# Patient Record
Sex: FEMALE | Race: WHITE | HISPANIC OR LATINO | Employment: FULL TIME | URBAN - METROPOLITAN AREA
[De-identification: names, ages, dates, MRNs, and addresses within clinical notes are randomized per-mention and may not be internally consistent; named-entity substitution may affect disease eponyms.]

---

## 2017-08-23 ENCOUNTER — APPOINTMENT (EMERGENCY)
Dept: ULTRASOUND IMAGING | Facility: HOSPITAL | Age: 27
End: 2017-08-23
Payer: COMMERCIAL

## 2017-08-23 ENCOUNTER — HOSPITAL ENCOUNTER (EMERGENCY)
Facility: HOSPITAL | Age: 27
Discharge: HOME | End: 2017-08-24
Attending: EMERGENCY MEDICINE
Payer: COMMERCIAL

## 2017-08-23 DIAGNOSIS — R10.31 RLQ ABDOMINAL PAIN: Primary | ICD-10-CM

## 2017-08-23 LAB
ALBUMIN SERPL BCP-MCNC: 3.6 G/DL (ref 3.5–5)
ALP SERPL-CCNC: 65 U/L (ref 46–116)
ALT SERPL W P-5'-P-CCNC: 29 U/L (ref 12–78)
ANION GAP SERPL CALCULATED.3IONS-SCNC: 8 MMOL/L (ref 4–13)
AST SERPL W P-5'-P-CCNC: 13 U/L (ref 5–45)
BASOPHILS # BLD AUTO: 0.02 THOUSANDS/ΜL (ref 0–0.1)
BASOPHILS NFR BLD AUTO: 0 % (ref 0–1)
BILIRUB SERPL-MCNC: 0.18 MG/DL (ref 0.2–1)
BILIRUB UR QL STRIP: NEGATIVE
BUN SERPL-MCNC: 9 MG/DL (ref 5–25)
CALCIUM SERPL-MCNC: 9.1 MG/DL (ref 8.3–10.1)
CHLORIDE SERPL-SCNC: 105 MMOL/L (ref 100–108)
CLARITY UR: CLEAR
CLARITY, POC: CLEAR
CO2 SERPL-SCNC: 28 MMOL/L (ref 21–32)
COLOR UR: YELLOW
COLOR, POC: YELLOW
CREAT SERPL-MCNC: 0.83 MG/DL (ref 0.6–1.3)
EOSINOPHIL # BLD AUTO: 0.12 THOUSAND/ΜL (ref 0–0.61)
EOSINOPHIL NFR BLD AUTO: 1 % (ref 0–6)
ERYTHROCYTE [DISTWIDTH] IN BLOOD BY AUTOMATED COUNT: 12.7 % (ref 11.6–15.1)
GFR SERPL CREATININE-BSD FRML MDRD: 98 ML/MIN/1.73SQ M
GLUCOSE SERPL-MCNC: 97 MG/DL (ref 65–140)
GLUCOSE UR STRIP-MCNC: NEGATIVE MG/DL
HCG UR QL: NEGATIVE
HCT VFR BLD AUTO: 38.3 % (ref 34.8–46.1)
HGB BLD-MCNC: 13.2 G/DL (ref 11.5–15.4)
HGB UR QL STRIP.AUTO: NEGATIVE
KETONES UR STRIP-MCNC: NEGATIVE MG/DL
LEUKOCYTE ESTERASE UR QL STRIP: NEGATIVE
LYMPHOCYTES # BLD AUTO: 3.63 THOUSANDS/ΜL (ref 0.6–4.47)
LYMPHOCYTES NFR BLD AUTO: 31 % (ref 14–44)
MCH RBC QN AUTO: 29 PG (ref 26.8–34.3)
MCHC RBC AUTO-ENTMCNC: 34.5 G/DL (ref 31.4–37.4)
MCV RBC AUTO: 84 FL (ref 82–98)
MONOCYTES # BLD AUTO: 0.96 THOUSAND/ΜL (ref 0.17–1.22)
MONOCYTES NFR BLD AUTO: 8 % (ref 4–12)
NEUTROPHILS # BLD AUTO: 7.18 THOUSANDS/ΜL (ref 1.85–7.62)
NEUTS SEG NFR BLD AUTO: 60 % (ref 43–75)
NITRITE UR QL STRIP: NEGATIVE
NRBC BLD AUTO-RTO: 0 /100 WBCS
PH UR STRIP.AUTO: 6 [PH] (ref 4.5–8)
PLATELET # BLD AUTO: 290 THOUSANDS/UL (ref 149–390)
PMV BLD AUTO: 9.7 FL (ref 8.9–12.7)
POTASSIUM SERPL-SCNC: 3.7 MMOL/L (ref 3.5–5.3)
PROT SERPL-MCNC: 8.2 G/DL (ref 6.4–8.2)
PROT UR STRIP-MCNC: NEGATIVE MG/DL
RBC # BLD AUTO: 4.55 MILLION/UL (ref 3.81–5.12)
SODIUM SERPL-SCNC: 141 MMOL/L (ref 136–145)
SP GR UR STRIP.AUTO: 1.02 (ref 1–1.03)
UROBILINOGEN UR QL STRIP.AUTO: 0.2 E.U./DL
WBC # BLD AUTO: 11.91 THOUSAND/UL (ref 4.31–10.16)

## 2017-08-23 PROCEDURE — 85025 COMPLETE CBC W/AUTO DIFF WBC: CPT | Performed by: NURSE PRACTITIONER

## 2017-08-23 PROCEDURE — 76830 TRANSVAGINAL US NON-OB: CPT

## 2017-08-23 PROCEDURE — 81003 URINALYSIS AUTO W/O SCOPE: CPT

## 2017-08-23 PROCEDURE — 96374 THER/PROPH/DIAG INJ IV PUSH: CPT

## 2017-08-23 PROCEDURE — 80053 COMPREHEN METABOLIC PANEL: CPT | Performed by: NURSE PRACTITIONER

## 2017-08-23 PROCEDURE — 76856 US EXAM PELVIC COMPLETE: CPT

## 2017-08-23 PROCEDURE — 81025 URINE PREGNANCY TEST: CPT | Performed by: EMERGENCY MEDICINE

## 2017-08-23 PROCEDURE — 36415 COLL VENOUS BLD VENIPUNCTURE: CPT | Performed by: NURSE PRACTITIONER

## 2017-08-23 PROCEDURE — 81002 URINALYSIS NONAUTO W/O SCOPE: CPT | Performed by: EMERGENCY MEDICINE

## 2017-08-23 PROCEDURE — 96361 HYDRATE IV INFUSION ADD-ON: CPT

## 2017-08-23 PROCEDURE — 93976 VASCULAR STUDY: CPT

## 2017-08-23 PROCEDURE — 96375 TX/PRO/DX INJ NEW DRUG ADDON: CPT

## 2017-08-23 RX ORDER — ONDANSETRON 2 MG/ML
4 INJECTION INTRAMUSCULAR; INTRAVENOUS ONCE
Status: COMPLETED | OUTPATIENT
Start: 2017-08-23 | End: 2017-08-23

## 2017-08-23 RX ORDER — KETOROLAC TROMETHAMINE 30 MG/ML
30 INJECTION, SOLUTION INTRAMUSCULAR; INTRAVENOUS ONCE
Status: COMPLETED | OUTPATIENT
Start: 2017-08-23 | End: 2017-08-23

## 2017-08-23 RX ADMIN — SODIUM CHLORIDE 1000 ML: 0.9 INJECTION, SOLUTION INTRAVENOUS at 20:18

## 2017-08-23 RX ADMIN — ONDANSETRON 4 MG: 2 INJECTION INTRAMUSCULAR; INTRAVENOUS at 20:19

## 2017-08-23 RX ADMIN — IOHEXOL 50 ML: 240 INJECTION, SOLUTION INTRATHECAL; INTRAVASCULAR; INTRAVENOUS; ORAL at 23:30

## 2017-08-23 RX ADMIN — KETOROLAC TROMETHAMINE 30 MG: 30 INJECTION, SOLUTION INTRAMUSCULAR at 20:19

## 2017-08-24 ENCOUNTER — APPOINTMENT (EMERGENCY)
Dept: CT IMAGING | Facility: HOSPITAL | Age: 27
End: 2017-08-24
Payer: COMMERCIAL

## 2017-08-24 VITALS
RESPIRATION RATE: 18 BRPM | OXYGEN SATURATION: 100 % | HEART RATE: 78 BPM | TEMPERATURE: 98.1 F | DIASTOLIC BLOOD PRESSURE: 66 MMHG | SYSTOLIC BLOOD PRESSURE: 104 MMHG

## 2017-08-24 PROCEDURE — 99284 EMERGENCY DEPT VISIT MOD MDM: CPT

## 2017-08-24 PROCEDURE — 74176 CT ABD & PELVIS W/O CONTRAST: CPT

## 2017-08-24 RX ORDER — IBUPROFEN 400 MG/1
400 TABLET ORAL EVERY 8 HOURS PRN
Qty: 30 TABLET | Refills: 0 | Status: SHIPPED | OUTPATIENT
Start: 2017-08-24 | End: 2017-09-03 | Stop reason: ALTCHOICE

## 2018-05-02 LAB
ABSOL LYMPHOCYTES (HISTORICAL): 3 K/UL (ref 0.5–4)
ALBUMIN SERPL BCP-MCNC: 4.4 G/DL (ref 3–5.2)
ALP SERPL-CCNC: 51 U/L (ref 43–122)
ALT SERPL W P-5'-P-CCNC: 33 U/L (ref 9–52)
ANION GAP SERPL CALCULATED.3IONS-SCNC: 10 MMOL/L (ref 5–14)
AST SERPL W P-5'-P-CCNC: 18 U/L (ref 14–36)
BASOPHILS # BLD AUTO: 0.1 K/UL (ref 0–0.1)
BASOPHILS # BLD AUTO: 1 % (ref 0–1)
BILIRUB SERPL-MCNC: 0.2 MG/DL
BUN SERPL-MCNC: 11 MG/DL (ref 5–25)
CALCIUM SERPL-MCNC: 9.3 MG/DL (ref 8.4–10.2)
CHLORIDE SERPL-SCNC: 103 MEQ/L (ref 97–108)
CO2 SERPL-SCNC: 26 MMOL/L (ref 22–30)
CREATINE, SERUM (HISTORICAL): 0.69 MG/DL (ref 0.6–1.2)
DEPRECATED RDW RBC AUTO: 14.1 %
EGFR (HISTORICAL): >60 ML/MIN/1.73 M2
EOSINOPHIL # BLD AUTO: 0.1 K/UL (ref 0–0.4)
EOSINOPHIL NFR BLD AUTO: 1 % (ref 0–6)
ERYTHROCYTE SEDIMENTATION RATE (HISTORICAL): 26 MM (ref 1–20)
GLUCOSE SERPL-MCNC: 93 MG/DL (ref 70–99)
HCT VFR BLD AUTO: 37.6 % (ref 36–46)
HGB BLD-MCNC: 12.1 G/DL (ref 12–16)
LYMPHOCYTES NFR BLD AUTO: 34 % (ref 25–45)
MCH RBC QN AUTO: 26.8 PG (ref 26–34)
MCHC RBC AUTO-ENTMCNC: 32.3 % (ref 31–36)
MCV RBC AUTO: 83 FL (ref 80–100)
MONOCYTES # BLD AUTO: 0.8 K/UL (ref 0.2–0.9)
MONOCYTES NFR BLD AUTO: 9 % (ref 1–10)
NEUTROPHILS ABS COUNT (HISTORICAL): 4.8 K/UL (ref 1.8–7.8)
NEUTS SEG NFR BLD AUTO: 55 % (ref 45–65)
PLATELET # BLD AUTO: 325 K/MCL (ref 150–450)
POTASSIUM SERPL-SCNC: 4.1 MEQ/L (ref 3.6–5)
RBC # BLD AUTO: 4.53 M/MCL (ref 4–5.2)
RHEUMATOID FACTOR (HISTORICAL): <8.6 IU/ML
SODIUM SERPL-SCNC: 139 MEQ/L (ref 137–147)
TOTAL PROTEIN (HISTORICAL): 7.5 G/DL (ref 5.9–8.4)
TSH SERPL DL<=0.05 MIU/L-ACNC: 1.52 UIU/ML (ref 0.47–4.68)
WBC # BLD AUTO: 8.7 K/MCL (ref 4.5–11)

## 2018-05-03 LAB — RPR SCREEN (HISTORICAL): NORMAL

## 2018-05-04 LAB — ANTI-NUCLEAR ANTIBODY (ANA) (HISTORICAL): NORMAL

## 2018-08-17 ENCOUNTER — OFFICE VISIT (OUTPATIENT)
Dept: FAMILY MEDICINE CLINIC | Facility: CLINIC | Age: 28
End: 2018-08-17
Payer: COMMERCIAL

## 2018-08-17 VITALS
SYSTOLIC BLOOD PRESSURE: 110 MMHG | HEART RATE: 101 BPM | HEIGHT: 62 IN | TEMPERATURE: 97 F | DIASTOLIC BLOOD PRESSURE: 90 MMHG | WEIGHT: 150 LBS | OXYGEN SATURATION: 99 % | RESPIRATION RATE: 16 BRPM | BODY MASS INDEX: 27.6 KG/M2

## 2018-08-17 DIAGNOSIS — J30.0 VASOMOTOR RHINITIS: ICD-10-CM

## 2018-08-17 DIAGNOSIS — N91.2 AMENORRHEA: Primary | ICD-10-CM

## 2018-08-17 DIAGNOSIS — M54.42 ACUTE BILATERAL LOW BACK PAIN WITH LEFT-SIDED SCIATICA: ICD-10-CM

## 2018-08-17 PROBLEM — M54.40 ACUTE BILATERAL LOW BACK PAIN WITH SCIATICA: Status: ACTIVE | Noted: 2018-08-17

## 2018-08-17 LAB — SL AMB POCT URINE HCG: NEGATIVE

## 2018-08-17 PROCEDURE — 99213 OFFICE O/P EST LOW 20 MIN: CPT | Performed by: FAMILY MEDICINE

## 2018-08-17 PROCEDURE — 3725F SCREEN DEPRESSION PERFORMED: CPT | Performed by: FAMILY MEDICINE

## 2018-08-17 PROCEDURE — 3008F BODY MASS INDEX DOCD: CPT | Performed by: FAMILY MEDICINE

## 2018-08-17 PROCEDURE — 81025 URINE PREGNANCY TEST: CPT | Performed by: FAMILY MEDICINE

## 2018-08-17 RX ORDER — GABAPENTIN 100 MG/1
CAPSULE ORAL
COMMUNITY
Start: 2018-05-16 | End: 2018-10-25

## 2018-08-17 RX ORDER — METHOCARBAMOL 500 MG/1
500 TABLET, FILM COATED ORAL 4 TIMES DAILY
Qty: 30 TABLET | Refills: 2 | Status: SHIPPED | OUTPATIENT
Start: 2018-08-17 | End: 2018-10-25

## 2018-08-17 RX ORDER — IBUPROFEN 800 MG/1
TABLET ORAL
COMMUNITY
Start: 2018-07-27 | End: 2018-10-25

## 2018-08-17 RX ORDER — CETIRIZINE HYDROCHLORIDE 10 MG/1
10 TABLET, CHEWABLE ORAL DAILY
Qty: 30 TABLET | Refills: 2 | Status: SHIPPED | OUTPATIENT
Start: 2018-08-17 | End: 2018-10-25

## 2018-08-17 RX ORDER — NAPROXEN 500 MG/1
TABLET ORAL EVERY 12 HOURS
COMMUNITY
Start: 2018-05-02 | End: 2018-10-25

## 2018-08-17 RX ORDER — SENNOSIDES 8.6 MG
CAPSULE ORAL EVERY 8 HOURS
COMMUNITY
Start: 2018-05-02 | End: 2018-10-25

## 2018-08-17 NOTE — LETTER
August 17, 2018     Patient: Luis Manuel Noel   YOB: 1990   Date of Visit: 8/17/2018       To Whom it May Concern:    Mellisa Shelley is under my professional care  She was seen in my office on 8/17/2018  She needs to be excused from work due to her condition  She may return to work on 8/23/2018  If you have any questions or concerns, please don't hesitate to call           Sincerely,          Joe Vang MD

## 2018-08-17 NOTE — PROGRESS NOTES
Assessment/Plan:    Vasomotor rhinitis  Cetiirizine reordered    Amenorrhea  The last menstrual period was May 10 patient denies any nausea or vomiting  Urine pregnancy test done in the office negative    Acute bilateral low back pain with sciatica  Patient was sent for physical therapy on the previous visit, but she was not able to finish due to increased pain, numbing sensation in her left leg with tingling, the MRI was recommended  Pain management with naproxen and ibuprofen with minimal relief, will add Robaxin  Excuse from work letter given for 5 days, encouraged to continue exercises to decrease muscle stiffness  Will follow up with MRI result       Diagnoses and all orders for this visit:    Amenorrhea  -     POCT urine HCG    Acute bilateral low back pain with left-sided sciatica  -     MRI lumbar spine wo contrast; Future  -     methocarbamol (ROBAXIN) 500 mg tablet; Take 1 tablet (500 mg total) by mouth 4 (four) times a day    Vasomotor rhinitis  -     cetirizine (ZyrTEC) 10 MG chewable tablet; Chew 1 tablet (10 mg total) daily    Other orders  -     ibuprofen (MOTRIN) 800 mg tablet;   -     gabapentin (NEURONTIN) 100 mg capsule;   -     acetaminophen (TYLENOL) 650 mg CR tablet; every 8 (eight) hours  -     naproxen (NAPROSYN) 500 mg tablet; Every 12 hours          Subjective:      Patient ID: Braden Jones is a 33 y o  female  This is a 81-year-old female who presents to follow-up with her chronic back pain that started about 3 months ago was suddenly while she was at home as per patient  She stated that her pain is constant, radiating into left thigh and left foot, shooting sensation to the left thigh  No difficulty with urination or moving her bowel  She was seen at physical therapy but she was not able to finish a complete course due to increased pain, and the imaging was recommended by PT          The following portions of the patient's history were reviewed and updated as appropriate: allergies, current medications, past family history, past medical history, past social history, past surgical history and problem list     Review of Systems   Constitutional: Negative for chills, diaphoresis, fatigue and fever  HENT: Negative for congestion, ear discharge, ear pain, mouth sores, rhinorrhea, sore throat and trouble swallowing  Eyes: Negative for photophobia, pain and discharge  Respiratory: Negative for cough, chest tightness, shortness of breath and wheezing  Cardiovascular: Negative for chest pain, palpitations and leg swelling  Gastrointestinal: Negative for abdominal distention, abdominal pain, blood in stool, constipation, diarrhea and nausea  Genitourinary: Negative for difficulty urinating and frequency  Musculoskeletal: Positive for back pain  Negative for joint swelling and neck stiffness  Skin: Negative for color change, pallor and rash  Neurological: Negative for dizziness, syncope, numbness and headaches  Objective:      /90 (BP Location: Left arm, Patient Position: Sitting, Cuff Size: Adult)   Pulse 101   Temp (!) 97 °F (36 1 °C) (Tympanic)   Resp 16   Ht 5' 2" (1 575 m)   Wt 68 kg (150 lb)   LMP 05/16/2018   SpO2 99%   Breastfeeding? No Comment: pt is unsure if she is pregnant, has not done home pregnancy test  BMI 27 44 kg/m²          Physical Exam   Constitutional: She is oriented to person, place, and time  She appears well-developed and well-nourished  No distress  HENT:   Head: Normocephalic and atraumatic  Eyes: EOM are normal  Pupils are equal, round, and reactive to light  No scleral icterus  Neck: Normal range of motion  Neck supple  Cardiovascular: Normal rate, regular rhythm and normal heart sounds  Exam reveals no gallop and no friction rub  No murmur heard  Pulmonary/Chest: Effort normal and breath sounds normal  No respiratory distress  She has no wheezes  She has no rales  She exhibits no tenderness     Abdominal: Soft  Bowel sounds are normal  She exhibits no distension  There is no tenderness  There is no rebound  Musculoskeletal: Normal range of motion  She exhibits no edema or deformity  Lumbar back: She exhibits tenderness and spasm  Positive straight leg raise test on the left   Lymphadenopathy:     She has no cervical adenopathy  Neurological: She is alert and oriented to person, place, and time  Skin: Skin is warm and dry  No rash noted  No erythema  No pallor  Psychiatric: She has a normal mood and affect

## 2018-08-17 NOTE — ASSESSMENT & PLAN NOTE
Patient was sent for physical therapy on the previous visit, but she was not able to finish due to increased pain, numbing sensation in her left leg with tingling, the MRI was recommended  Pain management with naproxen and ibuprofen with minimal relief, will add Robaxin  Excuse from work letter given for 5 days, encouraged to continue exercises to decrease muscle stiffness  Will follow up with MRI result

## 2018-08-17 NOTE — ASSESSMENT & PLAN NOTE
The last menstrual period was May 10 patient denies any nausea or vomiting  Urine pregnancy test done in the office negative

## 2018-08-17 NOTE — PATIENT INSTRUCTIONS
Dolor pushpa de espalda inferior   LO QUE NECESITA SABER:   El dolor pushpa de la región lumbar de la espalda es juan molestia repentina en la parte inferior de weber espalda que dura hasta por 6 semanas  La molestia hace que sea dificil que usted tolere la [place]  INSTRUCCIONES SOBRE EL [facility]:   Regrese a la nannette de emergencias si:   · Usted tiene dolor intenso  · Usted repentinamente tiene rigidez o siente pesadez en ambos glúteos hacia abajo de ambas piernas  · Usted tiene entumecimiento o debilidad en juan pierna o dolor en ambas piernas  · Usted tiene entumecimiento en el área genital o en la región lumbar  · Usted no puede controlar weber orina ni courtney deposiciones intestinales  Pregúntele a weber [place]ode vitaminas y minerales son adecuados para usted  · Usted tiene fiebre  · Usted tiene un dolor por la noche o cuando descansa  · Weber dolor no mejora con el tratamiento  · Usted tiene dolor que empeora cuando tose o estornuda  · Usted siente un estallido o chasquido repentino en weber espalda  · Usted tiene preguntas o inquietudes acerca de weber condición o cuidado  Medicamentos:  Los siguientes medicamentos pueden  ser recetados por weber médico:  · El acetaminofén  angie el dolor  Está disponible sin receta médica  Pregunte la cantidad y la frecuencia con que debe tomarlos  [address]  El acetaminofén puede causar daño en el hígado cuando no se gladis de forma correcta  · AINEs (Analgésicos antiinflamatorios no esteroides)  ayudan a disminuir la inflamación y el dolor  [place] medicamento esta disponible con o sin juan receta médica  Los AINEs pueden causar sangrado estomacal o problemas renales en ciertas personas  Si usted gladis un medicamento anticoagulante, siempre pregúntele a weber médico si los CHUNG son seguros para usted  Siempre lisa la etiqueta de kh[place]bu medicamento y [place] instrucciones  · Un medicamento con receta para el dolor  podrían ser [place]  Pregunte al médico cómo debe maria e khushbu medicamento de forma hurd  · Relajantes musculares  disminuyen el dolor y [employer] músculos de la parte inferior de la [place]  · [place] courtney medicamentos kunal se le haya indicado  Consulte con weber médico si usted baldemar que weber medicamento no le está ayudando o si presenta efectos secundarios  Infórmele si es alérgico a algún medicamento  Mantenga juan lista actualizada de los [employer], las vitaminas y los productos herbales que gladis  Incluya los siguientes datos de los medicamentos: cantidad, frecuencia y motivo de administración  Traiga con usted la lista o los envases de la píldoras a courtney citas de seguimiento  Lleve la lista de los medicamentos con usted en amirah de juan emergencia  Cuidados personales:   · Manténgase activo  lo más que pueda sin causar más dolor  El reposo en cama puede empeorar weber dolor de espalda  Comience con ejercicios ligeros kunal caminar  Evite levantar objetos hasta que ya no tenga dolor  Solicite más información acerca de las actividades físicas o plan de ejercicios que son los adecuados para usted  · El hielo  ayuda a disminuir la inflamación, el dolor y los espasmos musculares  Ponga hielo jostin en juan bolsa plástica  Cúbrala con juan toalla  Aplíquela en weber tamie lumbar por 20 a 30 minutos cada 2 horas  Jannie esto por 2 a 3 días después que el dolor empiece, o según lo indicado  · El calor  ayuda a disminuir dolor y espasmos musculares  Empiece a utilizar calor después de torie terminado el tratamiento con el hielo  Utilice juan toalla pequeña empapada con [place], juan almohada térmica o tome un baño de kiko con agua tibia  Aplíquese calor en el área lesionada josé luis 20 a 30 minutos cada 2 horas josé luis la cantidad de [employer] indiquen  [place] entre el calor y el hielo  Prevenir el dolor pushpa de la parte inferior de la espalda:   · Use la mecánica corporal adecuada        ¨ Flexione la cadera y las rodillas cuando Kellen Frannie a levantar un objeto  No doble la cintura  Utilice los [employer] de las piernas mientras levanta mace carga  No use mace espalda  Mantenga el objeto cerca de mace pecho mientras lo levanta  No se tuerza, ni levante cualquier cosa por encima de mace cintura  ¨ Cambie mace posición frecuentemente cuando pase mucho tiempo de pie  Descanse un pie sobre juan [place] o un reposapiés e intercambie con el otro pie frecuentemente  ¨ No permanezca sentado por lapsos de tiempo prolongados  Cuando sea necesario hacerlo, siéntese en juan silla de respaldo recto con los pies apoyados en el suelo  Nunca alcance, jale ni empuje mientras se encuentra sentando  · Jannie ejercicios que fortalezcan courtney músculos de la espalda  Entre en calor antes de hacer ejercicio  Consulte con mace médico sobre [employer] plan de ejercicios para usted  · Mantenga un peso saludable  Consulte con mace médico cuánto debería pesar  Pida que le ayude a crear un plan para bajar de peso si usted tiene sobrepeso  Acuda a courtney consultas de control con mace médico según le indicaron  Regrese a juan dahlia de seguimiento si usted aun tiene [employer] de 1 a 3 semanas de [place]  Puede que usted necesite acudir con un ortopedista si mace dolor de espalda dura más de 12 semanas  Anote courtney preguntas para que se acuerde de hacerlas josé luis courtney visitas  © 2017 [address] Information is for End User's use only and may not be sold, redistributed or otherwise used for commercial purposes  All illustrations and images included in CareNotes® are the copyrighted property of A D A M , Inc  or Damian Rao  Esta información es sólo para uso en educación  Mace intención no es darle un consejo médico sobre enfermedades o tratamientos  Colsulte con mace Kaela Blair farmacéutico antes de seguir cualquier régimen médico para saber si es seguro y efectivo para usted

## 2018-08-30 ENCOUNTER — HOSPITAL ENCOUNTER (OUTPATIENT)
Dept: MRI IMAGING | Facility: HOSPITAL | Age: 28
Discharge: HOME | End: 2018-08-30
Payer: COMMERCIAL

## 2018-08-30 DIAGNOSIS — M54.42 ACUTE BILATERAL LOW BACK PAIN WITH LEFT-SIDED SCIATICA: ICD-10-CM

## 2018-08-30 PROCEDURE — 72148 MRI LUMBAR SPINE W/O DYE: CPT

## 2018-09-11 ENCOUNTER — TELEPHONE (OUTPATIENT)
Dept: FAMILY MEDICINE CLINIC | Facility: CLINIC | Age: 28
End: 2018-09-11

## 2018-09-11 NOTE — TELEPHONE ENCOUNTER
Patient called into the front office requesting her results  Please contact pt with MRI results pt had done on 8/30/18 Macedonian speaking only

## 2018-09-12 ENCOUNTER — TELEPHONE (OUTPATIENT)
Dept: FAMILY MEDICINE CLINIC | Facility: CLINIC | Age: 28
End: 2018-09-12

## 2018-09-12 NOTE — TELEPHONE ENCOUNTER
Advised pt MRI results were normal and should keep f/u appt or reschedule sooner if need be  Pt stated she needed to be seen sooner because she can hardly move due to her pain  Pt stated the pain radiates to her legs  Pt also stated she has been experiencing a lot of pressure in her head  Transferred call to [facility] [place] to reschedule appt  Pt was aware if you were not available sooner, she would have to see a different provider

## 2018-10-25 ENCOUNTER — APPOINTMENT (EMERGENCY)
Dept: CT IMAGING | Facility: HOSPITAL | Age: 28
End: 2018-10-25
Payer: COMMERCIAL

## 2018-10-25 ENCOUNTER — HOSPITAL ENCOUNTER (EMERGENCY)
Facility: HOSPITAL | Age: 28
Discharge: HOME | End: 2018-10-25
Attending: EMERGENCY MEDICINE
Payer: COMMERCIAL

## 2018-10-25 VITALS
DIASTOLIC BLOOD PRESSURE: 66 MMHG | SYSTOLIC BLOOD PRESSURE: 102 MMHG | BODY MASS INDEX: 28.35 KG/M2 | WEIGHT: 155 LBS | HEART RATE: 77 BPM | OXYGEN SATURATION: 100 % | TEMPERATURE: 98.4 F | RESPIRATION RATE: 16 BRPM

## 2018-10-25 DIAGNOSIS — M62.830 BACK MUSCLE SPASM: ICD-10-CM

## 2018-10-25 DIAGNOSIS — M54.9 ACUTE BACK PAIN, UNSPECIFIED BACK LOCATION, UNSPECIFIED BACK PAIN LATERALITY: ICD-10-CM

## 2018-10-25 DIAGNOSIS — R10.9 ABDOMINAL PAIN, UNSPECIFIED ABDOMINAL LOCATION: Primary | ICD-10-CM

## 2018-10-25 LAB
ALBUMIN SERPL BCP-MCNC: 3.5 G/DL (ref 3.5–5)
ALP SERPL-CCNC: 66 U/L (ref 46–116)
ALT SERPL W P-5'-P-CCNC: 23 U/L (ref 12–78)
ANION GAP SERPL CALCULATED.3IONS-SCNC: 6 MMOL/L (ref 4–13)
AST SERPL W P-5'-P-CCNC: 13 U/L (ref 5–45)
BASOPHILS # BLD AUTO: 0.03 THOUSANDS/ΜL (ref 0–0.1)
BASOPHILS NFR BLD AUTO: 0 % (ref 0–1)
BILIRUB SERPL-MCNC: 0.22 MG/DL (ref 0.2–1)
BILIRUB UR QL STRIP: NEGATIVE
BUN SERPL-MCNC: 9 MG/DL (ref 5–25)
CALCIUM SERPL-MCNC: 9.1 MG/DL (ref 8.3–10.1)
CHLORIDE SERPL-SCNC: 105 MMOL/L (ref 100–108)
CLARITY UR: NORMAL
CO2 SERPL-SCNC: 28 MMOL/L (ref 21–32)
COLOR UR: YELLOW
CREAT SERPL-MCNC: 0.79 MG/DL (ref 0.6–1.3)
EOSINOPHIL # BLD AUTO: 0.17 THOUSAND/ΜL (ref 0–0.61)
EOSINOPHIL NFR BLD AUTO: 2 % (ref 0–6)
ERYTHROCYTE [DISTWIDTH] IN BLOOD BY AUTOMATED COUNT: 11.9 % (ref 11.6–15.1)
EXT PREG TEST URINE: NORMAL
GFR SERPL CREATININE-BSD FRML MDRD: 103 ML/MIN/1.73SQ M
GLUCOSE SERPL-MCNC: 104 MG/DL (ref 65–140)
GLUCOSE UR STRIP-MCNC: NEGATIVE MG/DL
HCT VFR BLD AUTO: 40.7 % (ref 34.8–46.1)
HGB BLD-MCNC: 13.7 G/DL (ref 11.5–15.4)
HGB UR QL STRIP.AUTO: NEGATIVE
IMM GRANULOCYTES # BLD AUTO: 0.02 THOUSAND/UL (ref 0–0.2)
IMM GRANULOCYTES NFR BLD AUTO: 0 % (ref 0–2)
KETONES UR STRIP-MCNC: NEGATIVE MG/DL
LEUKOCYTE ESTERASE UR QL STRIP: NEGATIVE
LIPASE SERPL-CCNC: 214 U/L (ref 73–393)
LYMPHOCYTES # BLD AUTO: 3.5 THOUSANDS/ΜL (ref 0.6–4.47)
LYMPHOCYTES NFR BLD AUTO: 46 % (ref 14–44)
MCH RBC QN AUTO: 28.8 PG (ref 26.8–34.3)
MCHC RBC AUTO-ENTMCNC: 33.7 G/DL (ref 31.4–37.4)
MCV RBC AUTO: 86 FL (ref 82–98)
MONOCYTES # BLD AUTO: 0.63 THOUSAND/ΜL (ref 0.17–1.22)
MONOCYTES NFR BLD AUTO: 8 % (ref 4–12)
NEUTROPHILS # BLD AUTO: 3.36 THOUSANDS/ΜL (ref 1.85–7.62)
NEUTS SEG NFR BLD AUTO: 44 % (ref 43–75)
NITRITE UR QL STRIP: NEGATIVE
NRBC BLD AUTO-RTO: 0 /100 WBCS
PH UR STRIP.AUTO: 5.5 [PH] (ref 4.5–8)
PLATELET # BLD AUTO: 295 THOUSANDS/UL (ref 149–390)
PMV BLD AUTO: 9.2 FL (ref 8.9–12.7)
POTASSIUM SERPL-SCNC: 3.8 MMOL/L (ref 3.5–5.3)
PROT SERPL-MCNC: 7.9 G/DL (ref 6.4–8.2)
PROT UR STRIP-MCNC: NEGATIVE MG/DL
RBC # BLD AUTO: 4.76 MILLION/UL (ref 3.81–5.12)
SODIUM SERPL-SCNC: 139 MMOL/L (ref 136–145)
SP GR UR STRIP.AUTO: >=1.03 (ref 1–1.03)
UROBILINOGEN UR QL STRIP.AUTO: 0.2 E.U./DL
WBC # BLD AUTO: 7.71 THOUSAND/UL (ref 4.31–10.16)

## 2018-10-25 PROCEDURE — 96374 THER/PROPH/DIAG INJ IV PUSH: CPT

## 2018-10-25 PROCEDURE — 80053 COMPREHEN METABOLIC PANEL: CPT | Performed by: NURSE PRACTITIONER

## 2018-10-25 PROCEDURE — 36415 COLL VENOUS BLD VENIPUNCTURE: CPT | Performed by: NURSE PRACTITIONER

## 2018-10-25 PROCEDURE — 83690 ASSAY OF LIPASE: CPT | Performed by: NURSE PRACTITIONER

## 2018-10-25 PROCEDURE — 85025 COMPLETE CBC W/AUTO DIFF WBC: CPT | Performed by: NURSE PRACTITIONER

## 2018-10-25 PROCEDURE — 81025 URINE PREGNANCY TEST: CPT | Performed by: NURSE PRACTITIONER

## 2018-10-25 PROCEDURE — 74176 CT ABD & PELVIS W/O CONTRAST: CPT

## 2018-10-25 PROCEDURE — 81003 URINALYSIS AUTO W/O SCOPE: CPT

## 2018-10-25 PROCEDURE — 99284 EMERGENCY DEPT VISIT MOD MDM: CPT

## 2018-10-25 RX ORDER — METHOCARBAMOL 500 MG/1
1000 TABLET, FILM COATED ORAL ONCE
Status: COMPLETED | OUTPATIENT
Start: 2018-10-25 | End: 2018-10-25

## 2018-10-25 RX ORDER — METHOCARBAMOL 750 MG/1
750 TABLET, FILM COATED ORAL 3 TIMES DAILY
Qty: 21 TABLET | Refills: 0 | Status: SHIPPED | OUTPATIENT
Start: 2018-10-25 | End: 2019-04-01

## 2018-10-25 RX ORDER — KETOROLAC TROMETHAMINE 30 MG/ML
15 INJECTION, SOLUTION INTRAMUSCULAR; INTRAVENOUS ONCE
Status: COMPLETED | OUTPATIENT
Start: 2018-10-25 | End: 2018-10-25

## 2018-10-25 RX ORDER — SENNOSIDES 8.6 MG
650 CAPSULE ORAL EVERY 8 HOURS PRN
Qty: 21 TABLET | Refills: 0 | Status: SHIPPED | OUTPATIENT
Start: 2018-10-25 | End: 2018-11-01

## 2018-10-25 RX ORDER — ACETAMINOPHEN 325 MG/1
975 TABLET ORAL ONCE
Status: COMPLETED | OUTPATIENT
Start: 2018-10-25 | End: 2018-10-25

## 2018-10-25 RX ADMIN — ACETAMINOPHEN 975 MG: 325 TABLET, FILM COATED ORAL at 11:05

## 2018-10-25 RX ADMIN — KETOROLAC TROMETHAMINE 15 MG: 30 INJECTION, SOLUTION INTRAMUSCULAR at 11:12

## 2018-10-25 RX ADMIN — METHOCARBAMOL 1000 MG: 500 TABLET, FILM COATED ORAL at 11:05

## 2018-10-25 NOTE — DISCHARGE INSTRUCTIONS
Dolor abdominal pushpa   LO QUE NECESITA SABER:   No se puede encontrar la causa del dolor abdominal  Si se encuentra juan causa, el tratamiento dependerá de cuál es cayden causa  INSTRUCCIONES SOBRE [facility]:   Regrese a la nannette de emergencias si:   · Usted no puede dejar de vomitar o vomita maria esther  · Usted tiene [employer] evacuaciones intestinales o estas tienen un aspecto alquitranado  · Usted tiene sangrado por weber recto  · El tamaño del abdomen es más violeta de lo normal y se siente [place] y [place] doloroso  · Usted tiene dolor abdominal intenso  · Usted anita de tener flatulencias y evacuaciones intestinales  · Usted se siente mareado, débil o tiene sensación de [place]  Pregúntele a weber [place] vitaminas y minerales son adecuados para usted  · Usted tiene fiebre  · Tiene nuevos signos y síntomas  · Deborah síntomas no mejoran con el tratamiento  · Usted tiene preguntas o inquietudes acerca de weber condición o cuidado  Medicamentos,  estos pueden administrarse para disminuir el dolor, tratar juan infección y [place] deborah síntomas  [place] los [employer] kunal se le haya indicado  Llame a weber médico si usted piensa que el medicamento no está ayudando o si tiene efectos secundarios  Infórmele si es alérgico a cualquier medicamento  Mantenga juan lista actualizada de los [employer], las vitaminas y los productos herbales que gladis  Incluya los siguientes datos de los medicamentos: cantidad, frecuencia y motivo de administración  Traiga con usted la lista o los envases de la píldoras a deborah citas de seguimiento  Lleve la lista de los medicamentos con usted en amirah de juan emergencia  El [place] de weber síntomas:   · La aplicación de calor  sobre el abdomen de 20 a 30 minutos cada 2 horas por los [employer] indiquen  El calor ayuda a disminuir el dolor y los espasmos musculares  · Controle weber estrés    El estrés puede causar dolor abdominal  Weber médico puede recomendarle técnicas de relajación y ejercicios de respiración profunda para ayudar a disminuir el estrés  Weber médico puede recomendarle que hable con alguien sobre weber estrés o ansiedad, kunal un consejero o un amigo de [place]  [place] lo suficiente y realice ejercicio regularmente  · Limite o no consuma bebidas alcohólicas  El alcohol puede empeorar el dolor abdominal  Pregunte a weber médico si usted puede maria e alcohol  Pregunte cuanto es la cantidad hurd para usted maria e  · No fume  La nicotina y otros químicos en los cigarrillos pueden dañarle el esófago y [place]  Pida información a weber médico si usted actualmente fuma y necesita ayuda para dejar de fumar  Los cigarrillos electrónicos o tabaco sin humo todavía contienen nicotina  Consulte con weber médico antes de [employer]  Realice cambios en los alimentos que consume según se le indique:  No coma alimentos que causan dolor abdominal u otros síntomas  Ingiera comidas pequeñas, más a menudo  · Coma más alimentos ricos en fibra si tiene estreñimiento  Los alimentos altos en fibra incluyen frutas, verduras, alimentos de grano integral y legumbres  · No coma alimentos que causan gas si tiene distensión  Por ejemplo, brócoli, col y coliflor  No shirley gaseosas o bebidas carbonadas, ya que también pueden provocarle gases  · No consuma alimentos o bebidas que contienen sorbitol o fructosa si tiene diarrea y distensión  Sadie Aurelio son jugos de frutas, dulces, mermeladas y gomas de mascar sin azúcar  · No coma alimentos altos en grasas, kunal comidas fritas, hamburguesas con queso, salchichas y postres  · Limite o no tome cafeína  La cafeína [employer], kunal la acidez o las náuseas  · [place] suficientes líquidos para evitar la deshidratación causada por la diarrea o los vómitos  Pregunte a weber médico sobre la cantidad de líquido que necesita maria e todos los días y cuáles le recomienda    Acuda a courtney consultas de control con weber médico según le indicaron  Anote courtney preguntas para que se acuerde de hacerlas josé luis courtney visitas  © 2017 [address] [employer] Information is for End User's use only and may not be sold, redistributed or otherwise used for commercial purposes  All illustrations and images included in CareNotes® are the copyrighted property of A D A M , Inc  or Damian Rao  Esta información es sólo para uso en educación  Mace intención no es darle un consejo médico sobre enfermedades o tratamientos  Colsulte con mace Mandy Antis farmacéutico antes de seguir cualquier régimen médico para saber si es seguro y efectivo para usted  Dolor de espalda   LO QUE NECESITA SABER:   El dolor de espalda es común  Puede ser causado por juan gran cantidad de afecciones, kunal la artritis o por el deterioro de los discos de la [place]umna vertebral  El riesgo del dolor de espalda aumenta al sufrir lesiones, por falta de [facility], o inclinarse hacia adelante o girar de un lado a otro de forma repetitiva  Usted puede estar adolorido o sentir rigidez en derek o ambos lados de la espalda  El dolor se puede propagar a courtney glúteos o muslos  INSTRUCCIONES SOBRE [facility]:   Medicamentos:   · AINEs (Analgésicos antiinflamatorios no esteroides)  ayudan a disminuir la inflamación y el dolor  [place] medicamento esta disponible con o sin juan receta médica  Los AINEs pueden causar sangrado estomacal o problemas renales en ciertas personas  Si usted gladis un medicamento anticoagulante, siempre pregúntele a mace médico si los CHUNG son seguros para usted  Siempre lisa la etiqueta de khushbu medicamento y [place] instrucciones  · El acetaminofén  [employer]  Está disponible sin receta médica  Pregunte la cantidad y la frecuencia con que debe tomarlos  [address]  El acetaminofén puede causar daño en el hígado cuando no se gladis de forma correcta      · Un medicamento con receta para el dolor  podrían ser administrados  Pregunte al médico cómo debe maria e khushbu medicamento de forma hurd  · [place] courtney medicamentos kunal se le haya indicado  Consulte con weber médico si usted baldemar que weber medicamento no le está ayudando o si presenta efectos secundarios  Infórmele si es alérgico a cualquier medicamento  Mantenga juan lista actualizada de los [place], las vitaminas y los productos herbales que gladis  Incluya los siguientes datos de los medicamentos: cantidad, frecuencia y motivo de administración  Traiga con usted la lista o los envases de la píldoras a courtney citas de seguimiento  Lleve la lista de los medicamentos con usted en amirah de juan emergencia  Acuda en 2 semanas a weber dahlia de control con weber médico, o según le indicaron:  Anote courtney preguntas para que se acuerde de hacerlas josé luis courtney visitas  La forma de controlar weber dolor de espalda:   · Aplique hielo  en weber espalda o en el área afectada de 15 a 20 minutos cada hora o según le indicaron  Use un paquete de hielo o ponga hielo molido dentro de [employer]  Cúbrala con juan toalla  El hielo disminuye el dolor y ayuda a evitar el daño en los tejidos  · La aplicación de calor  en weber espalda o en el área afectada de 20 a 30 minutos cada 2 horas josé luis los días que le indicaron  El calor ayuda a disminuir el dolor y los espasmos musculares  · Manténgase activo  lo más que pueda sin causar [employer]  El reposo en cama puede empeorar weber dolor de espalda  Evite levantar objetos hasta que ya no tenga dolor  Regrese a la nannette de emergencias si:   · Usted tiene dolor, entumecimiento o debilidad en juan o en ambas piernas  · El dolor se vuelve tan intenso que lo incapacita para caminar  · Usted no puede controlar weber orina ni courtney deposiciones intestinales  · Usted tiene dolor de espalda intenso con dolor en el pecho  · Usted tiene dolor de espalda intenso, náuseas y vómito      · Usted tiene un dolor de espalda intenso que se propaga a un costado o al área genital   Pregúntele a mace Gloria Mercer vitaminas y minerales son adecuados para usted  · Usted tiene dolor de espalda que no mejora con el reposo, ni con el medicamento para el dolor  · Usted tiene fiebre  · Usted tiene un dolor que empeora cuando está acostado boca Lilian Ambrosio o al descansar  · Usted tiene dolor que empeora cuando tose o estornuda  · Usted pierde peso sin proponérselo  · Usted tiene preguntas o inquietudes acerca de mace condición o cuidado  © 2017 [address] [employer] Information is for End User's use only and may not be sold, redistributed or otherwise used for commercial purposes  All illustrations and images included in CareNotes® are the copyrighted property of A D A M , Inc  or Damian Rao  Esta información es sólo para uso en educación  Mace intención no es darle un consejo médico sobre enfermedades o tratamientos  Colsulte con mace Classie Doe farmacéutico antes de seguir cualquier régimen médico para saber si es seguro y efectivo para usted  Espasmo muscular   LO QUE NECESITA SABER:   Un espasmo muscular es juan contracción convulsiva involuntaria de cualquier músculo o de un keron de músculos  Un calambre muscular es un espasmo muscular muy doloroso  Los calambres musculares generalmente ocurren después del ejercicio intenso o josé luis el [facility]  Estos también pueden ser provocados por ciertos medicamentos, la deshidratación, bajos niveles de calcio o magnesio u otras condiciones de [place] Vanetta Mcburney [facility]:   Medicamentos:  Usted podría  necesitar lo siguiente:  · AINEs (Analgésicos antiinflamatorios no esteroides)  pueden disminuir la inflamación y el dolor o la fiebre  [place] medicamento esta disponible con o sin juan receta médica  Los AINEs pueden causar sangrado estomacal o problemas renales en ciertas personas   Si usted gladis un medicamento anticoagulante, siempre pregúntele a mace médico si los CHUNG son seguros para usted  Siempre lisa la etiqueta de kh[place]bu medicamento y [place] instrucciones  · [place] courtney medicamentos kunal se le haya indicado  Consulte con weber médico si usted baldemar que weber medicamento no le está ayudando o si presenta efectos secundarios  Infórmele si es alérgico a algún medicamento  Mantenga juan lista actualizada de los [place], las vitaminas y los productos herbales que gladis  Incluya los siguientes datos de los medicamentos: cantidad, frecuencia y motivo de administración  Traiga con usted la lista o los envases de la píldoras a courtney citas de seguimiento  Lleve la lista de los medicamentos con usted en amirah de juan emergencia  Acuda a courtney consultas de control con weber médico según le indicaron  Usted puede necesitar otros exámenes o tratamientos  También es posible que lo refieran a un fisioterapeuta u otro especialista  Anote courtney preguntas para que se acuerde de hacerlas josé luis courtney visitas  Cuidados personales:   · El estiramiento  de weber músculo ayuda a aliviar el calambre  También puede ser de [place] el músculo estirado hasta que el calambre desaparezca  · Aplique calor  para ayudar a disminuir el dolor y el espasmo muscular  Aplíquese calor en el área lesionada josé luis 20 a 30 minutos cada 2 horas josé luis la cantidad de [employer] indiquen  · Aplique hielo  para ayudar a disminuir la inflamación y el dolor  El hielo también puede contribuir a evitar el daño de los tejidos  Use un paquete de hielo o ponga hielo molido dentro de [employer]  Envuelva la compresa o bolsa con juan toalla y colóquela sobre weber músculo por 15 a 20 minutos cada hora o kunal se lo indicaron  · Consuma más líquidos  para ayudar a prevenir espasmos musculares causados por la deshidratación  Las bebidas atléticas pueden ayudar a reemplazar los electrolitos que pierde josé luis el ejercicio por la sudoración   Pregunte a weber médico sobre la cantidad de líquido que necesita maria e todos los días y cuáles le recomienda  · Consuma alimentos saludables , kunal frutas, verduras, granos integrales, productos lácteos bajos en grasa y proteínas bajas en grasa (carne [place], legumbres y pescado)  Si está embarazada, pregúntele a mace médico qué alimentos son ricos en magnesio y [place]  Estos pueden ayudar para [employer] calambres josé luis el [facility]  · Dé masajes suaves sobre el músculo  para aliviar el calambre  · Respire profundo varias veces  hasta que el calambre se mejore  Acuéstese mientras respira profundo para que no sufra un mareo o desvanecimiento  Pregúntele a mace Louann Pal vitaminas y minerales son adecuados para usted  · Usted presenta signos de deshidratación, kunal dolor de [place] de color amarillo oscuro, ojos o boca secos o latidos cardíacos rápidos  · Usted tiene preguntas o inquietudes acerca de mace condición o cuidado  Regrese a la nannette de emergencias si:   · El músculo con el calambre está caliente al tacto, inflamado o enrojecido  · Usted sufre con frecuencia y sin alivio de calambres musculares en varios músculos diferentes  · Usted presenta calambres musculares con entumecimiento, cosquilleo y sensación quemante en courtney dayana y pies  © 2017 [address] [employer] Information is for End User's use only and may not be sold, redistributed or otherwise used for commercial purposes  All illustrations and images included in CareNotes® are the copyrighted property of A D A M , Inc  or Damian Rao  Esta información es sólo para uso en educación  Mace intención no es darle un consejo médico sobre enfermedades o tratamientos  Colsulte con mace Hilario Keas farmacéutico antes de seguir cualquier régimen médico para saber si es seguro y efectivo para usted

## 2019-04-01 ENCOUNTER — HOSPITAL ENCOUNTER (EMERGENCY)
Facility: HOSPITAL | Age: 29
Discharge: HOME | End: 2019-04-02
Attending: EMERGENCY MEDICINE | Admitting: EMERGENCY MEDICINE
Payer: COMMERCIAL

## 2019-04-01 DIAGNOSIS — R10.9 ABDOMINAL PAIN: ICD-10-CM

## 2019-04-01 DIAGNOSIS — R11.10 VOMITING AND DIARRHEA: Primary | ICD-10-CM

## 2019-04-01 DIAGNOSIS — B34.9 ACUTE VIRAL SYNDROME: ICD-10-CM

## 2019-04-01 DIAGNOSIS — R19.7 VOMITING AND DIARRHEA: Primary | ICD-10-CM

## 2019-04-01 LAB — EXT PREG TEST URINE: NEGATIVE

## 2019-04-01 PROCEDURE — 81003 URINALYSIS AUTO W/O SCOPE: CPT | Performed by: EMERGENCY MEDICINE

## 2019-04-01 PROCEDURE — 99284 EMERGENCY DEPT VISIT MOD MDM: CPT | Performed by: EMERGENCY MEDICINE

## 2019-04-01 PROCEDURE — 99283 EMERGENCY DEPT VISIT LOW MDM: CPT

## 2019-04-01 PROCEDURE — 81025 URINE PREGNANCY TEST: CPT | Performed by: EMERGENCY MEDICINE

## 2019-04-01 RX ORDER — ONDANSETRON 4 MG/1
8 TABLET, ORALLY DISINTEGRATING ORAL ONCE
Status: COMPLETED | OUTPATIENT
Start: 2019-04-01 | End: 2019-04-01

## 2019-04-01 RX ORDER — MAGNESIUM HYDROXIDE/ALUMINUM HYDROXICE/SIMETHICONE 120; 1200; 1200 MG/30ML; MG/30ML; MG/30ML
30 SUSPENSION ORAL ONCE
Status: COMPLETED | OUTPATIENT
Start: 2019-04-02 | End: 2019-04-02

## 2019-04-01 RX ORDER — KETOROLAC TROMETHAMINE 30 MG/ML
15 INJECTION, SOLUTION INTRAMUSCULAR; INTRAVENOUS ONCE
Status: COMPLETED | OUTPATIENT
Start: 2019-04-02 | End: 2019-04-02

## 2019-04-01 RX ORDER — METOCLOPRAMIDE HYDROCHLORIDE 5 MG/ML
10 INJECTION INTRAMUSCULAR; INTRAVENOUS ONCE
Status: COMPLETED | OUTPATIENT
Start: 2019-04-02 | End: 2019-04-02

## 2019-04-01 RX ADMIN — ONDANSETRON 8 MG: 4 TABLET, ORALLY DISINTEGRATING ORAL at 23:46

## 2019-04-02 VITALS
RESPIRATION RATE: 18 BRPM | DIASTOLIC BLOOD PRESSURE: 73 MMHG | SYSTOLIC BLOOD PRESSURE: 119 MMHG | OXYGEN SATURATION: 99 % | WEIGHT: 151.01 LBS | TEMPERATURE: 99.2 F | HEART RATE: 86 BPM | BODY MASS INDEX: 27.62 KG/M2

## 2019-04-02 LAB
BILIRUB UR QL STRIP: NEGATIVE
CLARITY UR: CLEAR
COLOR UR: NORMAL
GLUCOSE UR STRIP-MCNC: NEGATIVE MG/DL
HGB UR QL STRIP.AUTO: NEGATIVE
KETONES UR STRIP-MCNC: NEGATIVE MG/DL
LEUKOCYTE ESTERASE UR QL STRIP: NEGATIVE
NITRITE UR QL STRIP: NEGATIVE
PH UR STRIP.AUTO: 5 [PH]
PROT UR STRIP-MCNC: NEGATIVE MG/DL
SP GR UR STRIP.AUTO: 1.01 (ref 1–1.04)
UROBILINOGEN UA: NEGATIVE MG/DL

## 2019-04-02 PROCEDURE — 96374 THER/PROPH/DIAG INJ IV PUSH: CPT

## 2019-04-02 PROCEDURE — 96375 TX/PRO/DX INJ NEW DRUG ADDON: CPT

## 2019-04-02 RX ORDER — SUCRALFATE ORAL 1 G/10ML
1 SUSPENSION ORAL 4 TIMES DAILY
Qty: 420 ML | Refills: 0 | Status: SHIPPED | OUTPATIENT
Start: 2019-04-02 | End: 2021-03-18

## 2019-04-02 RX ORDER — DICYCLOMINE HCL 20 MG
20 TABLET ORAL 2 TIMES DAILY
Qty: 20 TABLET | Refills: 0 | Status: SHIPPED | OUTPATIENT
Start: 2019-04-02 | End: 2021-03-30

## 2019-04-02 RX ORDER — ONDANSETRON 4 MG/1
4 TABLET, FILM COATED ORAL EVERY 8 HOURS PRN
Qty: 12 TABLET | Refills: 0 | Status: SHIPPED | OUTPATIENT
Start: 2019-04-02 | End: 2021-03-18

## 2019-04-02 RX ADMIN — ALUMINUM HYDROXIDE, MAGNESIUM HYDROXIDE, AND SIMETHICONE 30 ML: 200; 200; 20 SUSPENSION ORAL at 00:06

## 2019-04-02 RX ADMIN — KETOROLAC TROMETHAMINE 15 MG: 30 INJECTION, SOLUTION INTRAMUSCULAR; INTRAVENOUS at 00:06

## 2019-04-02 RX ADMIN — METOCLOPRAMIDE 10 MG: 5 INJECTION, SOLUTION INTRAMUSCULAR; INTRAVENOUS at 00:06

## 2019-04-02 RX ADMIN — FAMOTIDINE 20 MG: 10 INJECTION, SOLUTION INTRAVENOUS at 00:06

## 2019-04-02 RX ADMIN — SODIUM CHLORIDE 1000 ML: 9 INJECTION, SOLUTION INTRAVENOUS at 00:07

## 2019-04-08 ENCOUNTER — OFFICE VISIT (OUTPATIENT)
Dept: FAMILY MEDICINE CLINIC | Facility: CLINIC | Age: 29
End: 2019-04-08

## 2019-04-08 VITALS
HEART RATE: 92 BPM | WEIGHT: 145 LBS | DIASTOLIC BLOOD PRESSURE: 78 MMHG | HEIGHT: 62 IN | BODY MASS INDEX: 26.68 KG/M2 | SYSTOLIC BLOOD PRESSURE: 104 MMHG | RESPIRATION RATE: 16 BRPM | OXYGEN SATURATION: 98 % | TEMPERATURE: 97 F

## 2019-04-08 DIAGNOSIS — A09 DIARRHEA OF INFECTIOUS ORIGIN: Primary | ICD-10-CM

## 2019-04-08 PROBLEM — R19.7 DIARRHEA: Status: ACTIVE | Noted: 2019-04-08

## 2019-04-08 PROCEDURE — 99213 OFFICE O/P EST LOW 20 MIN: CPT | Performed by: FAMILY MEDICINE

## 2019-07-02 ENCOUNTER — HOSPITAL ENCOUNTER (EMERGENCY)
Facility: HOSPITAL | Age: 29
Discharge: HOME | End: 2019-07-02
Attending: EMERGENCY MEDICINE
Payer: COMMERCIAL

## 2019-07-02 VITALS
WEIGHT: 150.35 LBS | TEMPERATURE: 98.1 F | SYSTOLIC BLOOD PRESSURE: 108 MMHG | BODY MASS INDEX: 27.5 KG/M2 | HEART RATE: 86 BPM | OXYGEN SATURATION: 100 % | DIASTOLIC BLOOD PRESSURE: 90 MMHG | RESPIRATION RATE: 16 BRPM

## 2019-07-02 DIAGNOSIS — J06.9 UPPER RESPIRATORY INFECTION: Primary | ICD-10-CM

## 2019-07-02 PROCEDURE — 99283 EMERGENCY DEPT VISIT LOW MDM: CPT | Performed by: PHYSICIAN ASSISTANT

## 2019-07-02 PROCEDURE — 99283 EMERGENCY DEPT VISIT LOW MDM: CPT

## 2019-07-02 RX ORDER — ACETAMINOPHEN 500 MG
500 TABLET ORAL EVERY 6 HOURS PRN
Qty: 30 TABLET | Refills: 0 | Status: SHIPPED | OUTPATIENT
Start: 2019-07-02 | End: 2021-03-30

## 2019-07-02 RX ORDER — FLUTICASONE PROPIONATE 50 MCG
1 SPRAY, SUSPENSION (ML) NASAL DAILY
Qty: 16 G | Refills: 0 | Status: SHIPPED | OUTPATIENT
Start: 2019-07-02 | End: 2021-03-18

## 2019-07-03 NOTE — ED PROVIDER NOTES
History  Chief Complaint   Patient presents with    Nasal Congestion     Pt c/o congestion with sinus pain and pressure with B/L earaches ongoing for 2 days    Facial Pain    Earache     Yuki Parson is a 31 yo F presenting with nasal congestion, rhinorrhea and sinus pain, as well as b/l ear pain x2 days  Also admits to sore throat  Denies coughing, SOB, or wheezing  Denies fevers/chills at home  No meds PTA  No known sick contacts  No recent travel  History provided by:  Patient   used: No    URI   Presenting symptoms: congestion, ear pain, facial pain, rhinorrhea and sore throat    Presenting symptoms: no cough, no fatigue and no fever    Onset quality:  Gradual  Duration:  2 days  Timing:  Constant  Progression:  Unchanged  Chronicity:  New  Relieved by:  None tried  Worsened by:  Nothing  Ineffective treatments:  None tried  Associated symptoms: sinus pain    Associated symptoms: no headaches, no myalgias, no neck pain and no wheezing    Risk factors: no recent travel and no sick contacts        Prior to Admission Medications   Prescriptions Last Dose Informant Patient Reported? Taking?   dicyclomine (BENTYL) 20 mg tablet   No No   Sig: Take 1 tablet (20 mg total) by mouth 2 (two) times a day   ondansetron (ZOFRAN) 4 mg tablet   No No   Sig: Take 1 tablet (4 mg total) by mouth every 8 (eight) hours as needed for nausea or vomiting   sucralfate (CARAFATE) 1 g/10 mL suspension   No No   Sig: Take 10 mL (1 g total) by mouth 4 (four) times a day      Facility-Administered Medications: None       Past Medical History:   Diagnosis Date    Known health problems: none        Past Surgical History:   Procedure Laterality Date    CHOLECYSTECTOMY      NO PAST SURGERIES         History reviewed  No pertinent family history  I have reviewed and agree with the history as documented      Social History     Tobacco Use    Smoking status: Never Smoker    Smokeless tobacco: Never Used    Tobacco comment: NO TOBACCO USE   Substance Use Topics    Alcohol use: No    Drug use: No        Review of Systems   Constitutional: Negative for chills, fatigue and fever  HENT: Positive for congestion, ear pain, rhinorrhea, sinus pain and sore throat  Negative for ear discharge, mouth sores and voice change  Eyes: Negative for pain, redness and visual disturbance  Respiratory: Negative for cough, chest tightness, shortness of breath and wheezing  Cardiovascular: Negative for chest pain and palpitations  Gastrointestinal: Negative for abdominal pain, constipation, diarrhea, nausea and vomiting  Genitourinary: Negative for dysuria, frequency and urgency  Musculoskeletal: Negative for myalgias, neck pain and neck stiffness  Skin: Negative for pallor, rash and wound  Neurological: Negative for dizziness, weakness, light-headedness, numbness and headaches  Physical Exam  Physical Exam   Constitutional: She is oriented to person, place, and time  She appears well-developed and well-nourished  No distress  HENT:   Head: Normocephalic and atraumatic  Right Ear: Tympanic membrane, external ear and ear canal normal    Left Ear: Tympanic membrane, external ear and ear canal normal    Nose: Right sinus exhibits maxillary sinus tenderness  Right sinus exhibits no frontal sinus tenderness  Left sinus exhibits maxillary sinus tenderness  Left sinus exhibits no frontal sinus tenderness  Mouth/Throat: Uvula is midline  Posterior oropharyngeal erythema present  No posterior oropharyngeal edema or tonsillar abscesses  No tonsillar exudate  Eyes: Pupils are equal, round, and reactive to light  Conjunctivae and EOM are normal    Neck: Normal range of motion  Neck supple  Cardiovascular: Normal rate, regular rhythm and normal heart sounds  Exam reveals no gallop and no friction rub  No murmur heard  Pulmonary/Chest: Effort normal and breath sounds normal  No stridor  No respiratory distress   She has no wheezes  She has no rales  Abdominal: Soft  Bowel sounds are normal  She exhibits no distension  There is no tenderness  There is no guarding  Lymphadenopathy:     She has no cervical adenopathy  Neurological: She is alert and oriented to person, place, and time  She exhibits normal muscle tone  Coordination normal    Skin: Skin is warm and dry  Capillary refill takes less than 2 seconds  No rash noted  She is not diaphoretic  No erythema  No pallor  Psychiatric: She has a normal mood and affect  Her behavior is normal  Judgment and thought content normal    Nursing note and vitals reviewed  Vital Signs  ED Triage Vitals [07/02/19 2115]   Temperature Pulse Respirations Blood Pressure SpO2   98 1 °F (36 7 °C) 86 16 108/90 100 %      Temp Source Heart Rate Source Patient Position - Orthostatic VS BP Location FiO2 (%)   Oral Monitor -- -- --      Pain Score       4           Vitals:    07/02/19 2115   BP: 108/90   Pulse: 86         Visual Acuity      ED Medications  Medications - No data to display    Diagnostic Studies  Results Reviewed     None                 No orders to display              Procedures  Procedures       ED Course                               MDM  Number of Diagnoses or Management Options  Upper respiratory infection:   Diagnosis management comments: Symptoms c/w viral URI  Sinus pressure and maxillary TTP, but duration of symptoms of 2 days  Will tx with decongestants, flonase   Recommend f/u in 7-10 days if symptoms persist     Patient Progress  Patient progress: stable      Disposition  Final diagnoses:   Upper respiratory infection     Time reflects when diagnosis was documented in both MDM as applicable and the Disposition within this note     Time User Action Codes Description Comment    7/2/2019  9:56 PM Carolynn Leiva Add [J06 [id number]] Upper respiratory infection       ED Disposition     ED Disposition Condition Date/Time Comment    Discharge Stable Tu Jul 2, 2019  9:56 PM Barney Rodriguez Camilla Rosa discharge to home/self care  Follow-up Information    None         Discharge Medication List as of 7/2/2019  9:59 PM      START taking these medications    Details   acetaminophen (TYLENOL) 500 mg tablet Take 1 tablet (500 mg total) by mouth every 6 (six) hours as needed for mild pain or fever, Starting Tue 7/2/2019, Print      fluticasone (FLONASE) 50 mcg/act nasal spray 1 spray into each nostril daily, Starting Tue 7/2/2019, Print      pseudoephedrine-dextromethorphan-guaifenesin (ROBITUSSIN-PE) 30- MG/5ML solution Take 10 mL by mouth 3 (three) times a day as needed for allergies, Starting Tue 7/2/2019, Print         CONTINUE these medications which have NOT CHANGED    Details   dicyclomine (BENTYL) 20 mg tablet Take 1 tablet (20 mg total) by mouth 2 (two) times a day, Starting Tue 4/2/2019, Print      ondansetron (ZOFRAN) 4 mg tablet Take 1 tablet (4 mg total) by mouth every 8 (eight) hours as needed for nausea or vomiting, Starting Tue 4/2/2019, Print      sucralfate (CARAFATE) 1 g/10 mL suspension Take 10 mL (1 g total) by mouth 4 (four) times a day, Starting Tue 4/2/2019, Print           No discharge procedures on file      ED Provider  Electronically Signed by           Luz Gregory PA-C  07/03/19 1038

## 2019-12-18 ENCOUNTER — OFFICE VISIT (OUTPATIENT)
Dept: FAMILY MEDICINE CLINIC | Facility: CLINIC | Age: 29
End: 2019-12-18

## 2019-12-18 VITALS
WEIGHT: 160 LBS | OXYGEN SATURATION: 98 % | RESPIRATION RATE: 18 BRPM | SYSTOLIC BLOOD PRESSURE: 110 MMHG | HEART RATE: 98 BPM | TEMPERATURE: 97.5 F | HEIGHT: 62 IN | BODY MASS INDEX: 29.44 KG/M2 | DIASTOLIC BLOOD PRESSURE: 66 MMHG

## 2019-12-18 DIAGNOSIS — J32.9 SINUSITIS, UNSPECIFIED CHRONICITY, UNSPECIFIED LOCATION: Primary | ICD-10-CM

## 2019-12-18 PROCEDURE — 3008F BODY MASS INDEX DOCD: CPT | Performed by: FAMILY MEDICINE

## 2019-12-18 PROCEDURE — 99213 OFFICE O/P EST LOW 20 MIN: CPT | Performed by: FAMILY MEDICINE

## 2019-12-18 PROCEDURE — 1036F TOBACCO NON-USER: CPT | Performed by: FAMILY MEDICINE

## 2019-12-18 RX ORDER — AZITHROMYCIN 250 MG/1
TABLET, FILM COATED ORAL
Qty: 6 TABLET | Refills: 0 | Status: SHIPPED | OUTPATIENT
Start: 2019-12-18 | End: 2019-12-23

## 2019-12-18 RX ORDER — BENZONATATE 200 MG/1
200 CAPSULE ORAL 3 TIMES DAILY PRN
Qty: 20 CAPSULE | Refills: 0 | Status: SHIPPED | OUTPATIENT
Start: 2019-12-18 | End: 2021-03-18

## 2019-12-18 RX ORDER — OXYMETAZOLINE HYDROCHLORIDE 0.05 G/100ML
2 SPRAY NASAL 2 TIMES DAILY
Qty: 30 ML | Refills: 0 | Status: SHIPPED | OUTPATIENT
Start: 2019-12-18 | End: 2021-03-18

## 2019-12-18 RX ORDER — AMOXICILLIN AND CLAVULANATE POTASSIUM 875; 125 MG/1; MG/1
1 TABLET, FILM COATED ORAL EVERY 12 HOURS SCHEDULED
Qty: 14 TABLET | Refills: 0 | Status: SHIPPED | OUTPATIENT
Start: 2019-12-18 | End: 2019-12-25

## 2019-12-18 RX ORDER — GUAIFENESIN 600 MG
1200 TABLET, EXTENDED RELEASE 12 HR ORAL EVERY 12 HOURS SCHEDULED
Qty: 20 TABLET | Refills: 0 | Status: SHIPPED | OUTPATIENT
Start: 2019-12-18 | End: 2019-12-23

## 2019-12-18 NOTE — PROGRESS NOTES
Assessment/Plan:    Sinusitis  There is clinical indication for starting antibiotic therapy at this time  Advised supportive treatment which consists of the increased rest (relatively), increased hydration, take Tylenol/ibuprofen as needed for fever  Patient advised to return to clinic if symptoms do not improve within 1 week   - Augmentin not an option as hx of epistaxis when taken previously, will proceed with Azithromycin   - Tessalon Perles, mucinex, afrin         Diagnoses and all orders for this visit:    Sinusitis, unspecified chronicity, unspecified location  -     amoxicillin-clavulanate (AUGMENTIN) 875-125 mg per tablet; Take 1 tablet by mouth every 12 (twelve) hours for 7 days  -     oxymetazoline (AFRIN) 0 05 % nasal spray; 2 sprays by Each Nare route 2 (two) times a day  -     guaiFENesin (MUCINEX) 600 mg 12 hr tablet; Take 2 tablets (1,200 mg total) by mouth every 12 (twelve) hours for 5 days  -     benzonatate (TESSALON) 200 MG capsule; Take 1 capsule (200 mg total) by mouth 3 (three) times a day as needed for cough  -     azithromycin (ZITHROMAX) 250 mg tablet; Take 2 tablets (500 mg total) by mouth daily for 1 day, THEN 1 tablet (250 mg total) daily for 4 days  Subjective:      Patient ID: Rehan Rivera is a 30 y o  female  Subjective    Rehan Rivera is a 30 y o  female who presents for evaluation of symptoms of a URI  Symptoms include congestion, cough described as nonproductive and worsening over time, facial pain, low grade fever, nasal congestion, sinus pressure, sneezing and sore throat  Onset of symptoms was 2 weeks ago and has been gradually worsening since that time  Treatment to date: cough suppressants              The following portions of the patient's history were reviewed and updated as appropriate: allergies, current medications, past family history, past medical history, past social history, past surgical history and problem list     Review of Systems Constitutional: Positive for chills, fatigue and fever  Negative for activity change, appetite change and unexpected weight change  HENT: Positive for postnasal drip, rhinorrhea, sinus pressure, sinus pain, sneezing and sore throat  Negative for tinnitus  Eyes: Negative for pain  Respiratory: Negative for cough, choking and chest tightness  Cardiovascular: Negative for chest pain, palpitations and leg swelling  Gastrointestinal: Negative for abdominal distention, abdominal pain, constipation, diarrhea, nausea and vomiting  Endocrine: Negative for polydipsia, polyphagia and polyuria  Genitourinary: Negative for dysuria and flank pain  Musculoskeletal: Negative for back pain  Neurological: Negative for dizziness, seizures, syncope, speech difficulty, light-headedness and numbness  Psychiatric/Behavioral: Negative for agitation  Objective:      /66 (BP Location: Right arm, Patient Position: Sitting, Cuff Size: Standard)   Pulse 98   Temp 97 5 °F (36 4 °C) (Temporal)   Resp 18   Ht 5' 2" (1 575 m)   Wt 72 6 kg (160 lb)   LMP 12/18/2019 (Approximate)   SpO2 98%   BMI 29 26 kg/m²          Physical Exam   Constitutional: She is oriented to person, place, and time  She appears well-developed  No distress  HENT:   Head: Normocephalic and atraumatic  Right Ear: External ear normal    Left Ear: External ear normal    Nose: Right sinus exhibits maxillary sinus tenderness and frontal sinus tenderness  Left sinus exhibits frontal sinus tenderness  Left sinus exhibits no maxillary sinus tenderness  Eyes: Pupils are equal, round, and reactive to light  Neck: Neck supple  No tracheal deviation present  No thyromegaly present  Cardiovascular: Normal rate, regular rhythm and normal heart sounds  No murmur heard  Pulmonary/Chest: Effort normal and breath sounds normal  No respiratory distress  She has no wheezes  Abdominal: Soft   Bowel sounds are normal  She exhibits no distension  There is no tenderness  There is no guarding  Musculoskeletal: Normal range of motion  She exhibits no edema  Neurological: She is alert and oriented to person, place, and time  Skin: Skin is warm  No rash noted  She is not diaphoretic  Psychiatric: She has a normal mood and affect

## 2019-12-18 NOTE — ASSESSMENT & PLAN NOTE
There is clinical indication for starting antibiotic therapy at this time  Advised supportive treatment which consists of the increased rest (relatively), increased hydration, take Tylenol/ibuprofen as needed for fever    Patient advised to return to clinic if symptoms do not improve within 1 week   - Augmentin not an option as hx of epistaxis when taken previously, will proceed with Azithromycin   - Tessalon Perles, mucinex, afrin

## 2020-02-03 ENCOUNTER — TELEPHONE (OUTPATIENT)
Dept: FAMILY MEDICINE CLINIC | Facility: CLINIC | Age: 30
End: 2020-02-03

## 2020-03-16 ENCOUNTER — APPOINTMENT (EMERGENCY)
Dept: RADIOLOGY | Facility: HOSPITAL | Age: 30
End: 2020-03-16
Payer: COMMERCIAL

## 2020-03-16 ENCOUNTER — HOSPITAL ENCOUNTER (EMERGENCY)
Facility: HOSPITAL | Age: 30
Discharge: HOME | End: 2020-03-17
Attending: EMERGENCY MEDICINE | Admitting: EMERGENCY MEDICINE
Payer: COMMERCIAL

## 2020-03-16 DIAGNOSIS — S16.1XXA STRAIN OF NECK MUSCLE, INITIAL ENCOUNTER: ICD-10-CM
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
## 2020-03-16 LAB
BILIRUB UR QL STRIP: NEGATIVE
CLARITY UR: CLEAR
COLOR UR: YELLOW
EXT PREG TEST URINE: NEGATIVE
EXT. CONTROL ED NAV: NORMAL
GLUCOSE UR STRIP-MCNC: NEGATIVE MG/DL
HGB UR QL STRIP.AUTO: ABNORMAL
KETONES UR STRIP-MCNC: NEGATIVE MG/DL
LEUKOCYTE ESTERASE UR QL STRIP: ABNORMAL
NITRITE UR QL STRIP: NEGATIVE
PH UR STRIP.AUTO: 7 [PH] (ref 4.5–8)
PROT UR STRIP-MCNC: NEGATIVE MG/DL
SP GR UR STRIP.AUTO: 1.02 (ref 1–1.03)
UROBILINOGEN UR QL STRIP.AUTO: 0.2 E.U./DL

## 2020-03-16 PROCEDURE — 74176 CT ABD & PELVIS W/O CONTRAST: CPT

## 2020-03-16 PROCEDURE — 99284 EMERGENCY DEPT VISIT MOD MDM: CPT | Performed by: EMERGENCY MEDICINE

## 2020-03-16 PROCEDURE — 81025 URINE PREGNANCY TEST: CPT | Performed by: EMERGENCY MEDICINE

## 2020-03-16 PROCEDURE — 87086 URINE CULTURE/COLONY COUNT: CPT

## 2020-03-16 PROCEDURE — 72125 CT NECK SPINE W/O DYE: CPT

## 2020-03-16 PROCEDURE — 81001 URINALYSIS AUTO W/SCOPE: CPT

## 2020-03-16 PROCEDURE — 99284 EMERGENCY DEPT VISIT MOD MDM: CPT

## 2020-03-16 PROCEDURE — 70450 CT HEAD/BRAIN W/O DYE: CPT

## 2020-03-16 PROCEDURE — 71250 CT THORAX DX C-: CPT

## 2020-03-17 VITALS
OXYGEN SATURATION: 100 % | HEART RATE: 87 BPM | DIASTOLIC BLOOD PRESSURE: 75 MMHG | RESPIRATION RATE: 18 BRPM | BODY MASS INDEX: 28.35 KG/M2 | HEIGHT: 63 IN | WEIGHT: 160 LBS | TEMPERATURE: 100.1 F | SYSTOLIC BLOOD PRESSURE: 111 MMHG

## 2020-03-17 LAB
BACTERIA UR QL AUTO: ABNORMAL /HPF
HYALINE CASTS #/AREA URNS LPF: ABNORMAL /LPF
NON-SQ EPI CELLS URNS QL MICRO: ABNORMAL /HPF
RBC #/AREA URNS AUTO: ABNORMAL /HPF
WBC #/AREA URNS AUTO: ABNORMAL /HPF

## 2020-03-17 RX ORDER — ACETAMINOPHEN 325 MG/1
975 TABLET ORAL ONCE
Status: DISCONTINUED | OUTPATIENT
Start: 2020-03-17 | End: 2020-03-17 | Stop reason: HOSPADM

## 2020-03-17 RX ORDER — LIDOCAINE 40 MG/G
CREAM TOPICAL AS NEEDED
Qty: 30 G | Refills: 0 | Status: SHIPPED | OUTPATIENT
Start: 2020-03-17 | End: 2021-03-18

## 2020-03-17 RX ORDER — ACETAMINOPHEN 500 MG
1000 TABLET ORAL EVERY 6 HOURS PRN
Qty: 30 TABLET | Refills: 0 | Status: SHIPPED | OUTPATIENT
Start: 2020-03-17 | End: 2020-07-16

## 2020-03-17 RX ORDER — CYCLOBENZAPRINE HCL 10 MG
10 TABLET ORAL 2 TIMES DAILY PRN
Qty: 12 TABLET | Refills: 0 | Status: SHIPPED | OUTPATIENT
Start: 2020-03-17 | End: 2021-03-30

## 2020-03-17 RX ORDER — LIDOCAINE 50 MG/G
1 PATCH TOPICAL ONCE
Status: DISCONTINUED | OUTPATIENT
Start: 2020-03-17 | End: 2020-03-17 | Stop reason: HOSPADM

## 2020-03-17 RX ORDER — IBUPROFEN 600 MG/1
600 TABLET ORAL EVERY 6 HOURS PRN
Qty: 30 TABLET | Refills: 0 | Status: SHIPPED | OUTPATIENT
Start: 2020-03-17 | End: 2021-03-30
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
## 2020-03-18 LAB — BACTERIA UR CULT: NORMAL

## 2020-07-16 RX ORDER — METHYLPREDNISOLONE 4 MG/1
TABLET ORAL
COMMUNITY
Start: 2019-12-25 | End: 2021-03-30

## 2020-07-20 ENCOUNTER — OFFICE VISIT (OUTPATIENT)
Dept: FAMILY MEDICINE CLINIC | Facility: CLINIC | Age: 30
End: 2020-07-20

## 2020-07-20 ENCOUNTER — APPOINTMENT (OUTPATIENT)
Dept: LAB | Facility: HOSPITAL | Age: 30
End: 2020-07-20
Payer: COMMERCIAL

## 2020-07-20 VITALS
HEART RATE: 87 BPM | TEMPERATURE: 98.2 F | BODY MASS INDEX: 30.87 KG/M2 | SYSTOLIC BLOOD PRESSURE: 108 MMHG | OXYGEN SATURATION: 99 % | RESPIRATION RATE: 20 BRPM | DIASTOLIC BLOOD PRESSURE: 72 MMHG | HEIGHT: 63 IN | WEIGHT: 174.2 LBS

## 2020-07-20 DIAGNOSIS — R53.83 FATIGUE, UNSPECIFIED TYPE: Primary | ICD-10-CM

## 2020-07-20 DIAGNOSIS — N91.2 AMENORRHEA: ICD-10-CM

## 2020-07-20 DIAGNOSIS — R53.83 FATIGUE, UNSPECIFIED TYPE: ICD-10-CM

## 2020-07-20 LAB
ALBUMIN SERPL BCP-MCNC: 3.9 G/DL (ref 3–5.2)
ALP SERPL-CCNC: 60 U/L (ref 43–122)
ALT SERPL W P-5'-P-CCNC: 65 U/L (ref 9–52)
ANION GAP SERPL CALCULATED.3IONS-SCNC: 5 MMOL/L (ref 5–14)
AST SERPL W P-5'-P-CCNC: 48 U/L (ref 14–36)
BASOPHILS # BLD AUTO: 0 THOUSANDS/ΜL (ref 0–0.1)
BASOPHILS NFR BLD AUTO: 1 % (ref 0–1)
BILIRUB SERPL-MCNC: 0.3 MG/DL
BUN SERPL-MCNC: 9 MG/DL (ref 5–25)
CALCIUM SERPL-MCNC: 9.2 MG/DL (ref 8.4–10.2)
CHLORIDE SERPL-SCNC: 108 MMOL/L (ref 97–108)
CO2 SERPL-SCNC: 25 MMOL/L (ref 22–30)
CREAT SERPL-MCNC: 0.62 MG/DL (ref 0.6–1.2)
EOSINOPHIL # BLD AUTO: 0.1 THOUSAND/ΜL (ref 0–0.4)
EOSINOPHIL NFR BLD AUTO: 2 % (ref 0–6)
ERYTHROCYTE [DISTWIDTH] IN BLOOD BY AUTOMATED COUNT: 12.9 %
GFR SERPL CREATININE-BSD FRML MDRD: 122 ML/MIN/1.73SQ M
GLUCOSE SERPL-MCNC: 107 MG/DL (ref 70–99)
HCT VFR BLD AUTO: 38.8 % (ref 36–46)
HGB BLD-MCNC: 13 G/DL (ref 12–16)
LYMPHOCYTES # BLD AUTO: 2.5 THOUSANDS/ΜL (ref 0.5–4)
LYMPHOCYTES NFR BLD AUTO: 41 % (ref 25–45)
MCH RBC QN AUTO: 28.5 PG (ref 26–34)
MCHC RBC AUTO-ENTMCNC: 33.5 G/DL (ref 31–36)
MCV RBC AUTO: 85 FL (ref 80–100)
MONOCYTES # BLD AUTO: 0.6 THOUSAND/ΜL (ref 0.2–0.9)
MONOCYTES NFR BLD AUTO: 10 % (ref 1–10)
NEUTROPHILS # BLD AUTO: 2.8 THOUSANDS/ΜL (ref 1.8–7.8)
NEUTS SEG NFR BLD AUTO: 47 % (ref 45–65)
PLATELET # BLD AUTO: 330 THOUSANDS/UL (ref 150–450)
PMV BLD AUTO: 8.1 FL (ref 8.9–12.7)
POTASSIUM SERPL-SCNC: 3.9 MMOL/L (ref 3.6–5)
PROT SERPL-MCNC: 7.5 G/DL (ref 5.9–8.4)
RBC # BLD AUTO: 4.55 MILLION/UL (ref 4–5.2)
SODIUM SERPL-SCNC: 138 MMOL/L (ref 137–147)
TSH SERPL DL<=0.05 MIU/L-ACNC: 1.42 UIU/ML (ref 0.47–4.68)
WBC # BLD AUTO: 6 THOUSAND/UL (ref 4.5–11)

## 2020-07-20 PROCEDURE — 36415 COLL VENOUS BLD VENIPUNCTURE: CPT

## 2020-07-20 PROCEDURE — 85025 COMPLETE CBC W/AUTO DIFF WBC: CPT

## 2020-07-20 PROCEDURE — 1036F TOBACCO NON-USER: CPT | Performed by: FAMILY MEDICINE

## 2020-07-20 PROCEDURE — 3008F BODY MASS INDEX DOCD: CPT | Performed by: FAMILY MEDICINE

## 2020-07-20 PROCEDURE — 99213 OFFICE O/P EST LOW 20 MIN: CPT | Performed by: FAMILY MEDICINE

## 2020-07-20 PROCEDURE — 80053 COMPREHEN METABOLIC PANEL: CPT

## 2020-07-20 PROCEDURE — 84443 ASSAY THYROID STIM HORMONE: CPT

## 2020-07-24 ENCOUNTER — TELEPHONE (OUTPATIENT)
Dept: FAMILY MEDICINE CLINIC | Facility: CLINIC | Age: 30
End: 2020-07-24

## 2020-07-24 DIAGNOSIS — J32.9 SINUSITIS, UNSPECIFIED CHRONICITY, UNSPECIFIED LOCATION: Primary | ICD-10-CM

## 2020-07-24 RX ORDER — CETIRIZINE HYDROCHLORIDE 10 MG/1
10 TABLET ORAL DAILY
Qty: 90 TABLET | Refills: 1 | Status: SHIPPED | OUTPATIENT
Start: 2020-07-24 | End: 2021-03-18
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
## 2021-03-01 ENCOUNTER — HOSPITAL ENCOUNTER (OUTPATIENT)
Dept: RADIOLOGY | Facility: HOSPITAL | Age: 31
Discharge: HOME | End: 2021-03-01
Payer: COMMERCIAL

## 2021-03-01 ENCOUNTER — OFFICE VISIT (OUTPATIENT)
Dept: FAMILY MEDICINE CLINIC | Facility: CLINIC | Age: 31
End: 2021-03-01

## 2021-03-01 VITALS
SYSTOLIC BLOOD PRESSURE: 108 MMHG | DIASTOLIC BLOOD PRESSURE: 70 MMHG | OXYGEN SATURATION: 99 % | BODY MASS INDEX: 30.05 KG/M2 | TEMPERATURE: 96.1 F | RESPIRATION RATE: 18 BRPM | HEART RATE: 76 BPM | HEIGHT: 63 IN | WEIGHT: 169.6 LBS

## 2021-03-01 DIAGNOSIS — R07.81 RIB PAIN ON RIGHT SIDE: ICD-10-CM

## 2021-03-01 DIAGNOSIS — R07.81 RIB PAIN ON RIGHT SIDE: Primary | ICD-10-CM

## 2021-03-01 DIAGNOSIS — K21.9 GASTROESOPHAGEAL REFLUX DISEASE, UNSPECIFIED WHETHER ESOPHAGITIS PRESENT: ICD-10-CM

## 2021-03-01 PROCEDURE — 99214 OFFICE O/P EST MOD 30 MIN: CPT | Performed by: INTERNAL MEDICINE

## 2021-03-01 PROCEDURE — 71046 X-RAY EXAM CHEST 2 VIEWS: CPT

## 2021-03-01 RX ORDER — PANTOPRAZOLE SODIUM 20 MG/1
20 TABLET, DELAYED RELEASE ORAL
Qty: 90 TABLET | Refills: 0 | Status: SHIPPED | OUTPATIENT
Start: 2021-03-01 | End: 2021-03-18 | Stop reason: SDUPTHER

## 2021-03-01 RX ORDER — METHOCARBAMOL 500 MG/1
500 TABLET, FILM COATED ORAL 4 TIMES DAILY
Qty: 28 TABLET | Refills: 0 | Status: SHIPPED | OUTPATIENT
Start: 2021-03-01 | End: 2021-08-09

## 2021-03-01 RX ORDER — IBUPROFEN 600 MG/1
600 TABLET ORAL EVERY 6 HOURS PRN
Qty: 30 TABLET | Refills: 0 | Status: SHIPPED | OUTPATIENT
Start: 2021-03-01 | End: 2021-03-18

## 2021-03-01 NOTE — ASSESSMENT & PLAN NOTE
Avoid gastric irritants, like tomato products, caffeine, alcohol, and spicy foods  Raise the head of bed, 6 inches, using books or blocks  Use the medication to decrease acid secretion, as prescribed     - Antacid and pepcid innefective, will trial PPI   - Hx diverticulosis, consider GI referral in future if still unable to tolerate solids

## 2021-03-01 NOTE — ASSESSMENT & PLAN NOTE
Appears muscoskeletal  Encouraged to use looser fitting bra as this may be contributing to presentation  Trial NSAIDS muscle relaxant and CXR to rule out any osseous abnormalities   - follow up 2 weeks

## 2021-03-01 NOTE — PROGRESS NOTES
Assessment/Plan:    Gastroesophageal reflux disease  Avoid gastric irritants, like tomato products, caffeine, alcohol, and spicy foods  Raise the head of bed, 6 inches, using books or blocks  Use the medication to decrease acid secretion, as prescribed  - Antacid and pepcid innefective, will trial PPI   - Hx diverticulosis, consider GI referral in future if still unable to tolerate solids      Rib pain on right side  Appears muscoskeletal  Encouraged to use looser fitting bra as this may be contributing to presentation  Trial NSAIDS muscle relaxant and CXR to rule out any osseous abnormalities   - follow up 2 weeks       Diagnoses and all orders for this visit:    Rib pain on right side  -     ibuprofen (MOTRIN) 600 mg tablet; Take 1 tablet (600 mg total) by mouth every 6 (six) hours as needed for mild pain  -     XR chest pa & lateral; Future  -     methocarbamol (ROBAXIN) 500 mg tablet; Take 1 tablet (500 mg total) by mouth 4 (four) times a day    Gastroesophageal reflux disease, unspecified whether esophagitis present  -     pantoprazole (PROTONIX) 20 mg tablet; Take 1 tablet (20 mg total) by mouth daily before breakfast    Other orders  -     Cancel: influenza vaccine, quadrivalent, 0 5 mL, preservative-free, for adult and pediatric patients 6 mos+ (AFLURIA, FLUARIX, FLULAVAL, FLUZONE)          Subjective:      Patient ID: Jihan Macedo is a 35 y o  female  HPI     patient is exclusively [place]an-speaking  services utilized with examination  28-year-old female presents to clinic for sick visit complaining of right-sided chest wall pain  Has been ongoing for past 3 months she denies any recent trauma falls bug bite under sick contacts  Reports using same bra which appears to very tight         Additionally reports having a GI issues, states she has only been drinking juices and soups as she is unable to tolerate solid as she often develops nausea afterwards has tried time and Pepcid in past with minimal relief  The following portions of the patient's history were reviewed and updated as appropriate: allergies, current medications, past family history, past medical history, past social history, past surgical history and problem list     Review of Systems   Constitutional: Negative for activity change, appetite change, fatigue, fever and unexpected weight change  HENT: Negative for sneezing, sore throat and tinnitus  Eyes: Negative for pain  Respiratory: Negative for cough, choking and chest tightness  Cardiovascular: Negative for chest pain, palpitations and leg swelling  Gastrointestinal: Negative for abdominal distention, abdominal pain, constipation, diarrhea, nausea and vomiting  Endocrine: Negative for polydipsia, polyphagia and polyuria  Genitourinary: Negative for dysuria and flank pain  Musculoskeletal: Positive for back pain  Neurological: Negative for dizziness, seizures, syncope, speech difficulty, light-headedness and numbness  Psychiatric/Behavioral: Negative for agitation  Objective:      /70 (BP Location: Left arm, Patient Position: Sitting, Cuff Size: Standard)   Pulse 76   Temp (!) 96 1 °F (35 6 °C) (Temporal)   Resp 18   Ht 5' 3" (1 6 m)   Wt 76 9 kg (169 lb 9 6 oz)   LMP 02/26/2021 (Approximate)   SpO2 99%   Breastfeeding No   BMI 30 04 kg/m²          Physical Exam  Constitutional:       General: She is not in acute distress  Appearance: She is well-developed  She is not diaphoretic  HENT:      Head: Normocephalic and atraumatic  Eyes:      Pupils: Pupils are equal, round, and reactive to light  Neck:      Musculoskeletal: Neck supple  Thyroid: No thyromegaly  Trachea: No tracheal deviation  Cardiovascular:      Rate and Rhythm: Normal rate and regular rhythm  Heart sounds: Normal heart sounds  No murmur  Pulmonary:      Effort: Pulmonary effort is normal  No respiratory distress        Breath sounds: Normal breath sounds  No wheezing  Abdominal:      General: Bowel sounds are normal  There is no distension  Palpations: Abdomen is soft  Tenderness: There is no abdominal tenderness  There is no guarding  Musculoskeletal: Normal range of motion  Skin:     General: Skin is warm  Findings: No rash  Comments: Point tenderness to area above   no rest or erythema noted   no palpable mass   Neurological:      Mental Status: She is alert and oriented to person, place, and time

## 2021-03-18 ENCOUNTER — APPOINTMENT (EMERGENCY)
Dept: RADIOLOGY | Facility: HOSPITAL | Age: 31
End: 2021-03-18
Payer: COMMERCIAL

## 2021-03-18 ENCOUNTER — HOSPITAL ENCOUNTER (EMERGENCY)
Facility: HOSPITAL | Age: 31
Discharge: HOME | End: 2021-03-18
Attending: EMERGENCY MEDICINE | Admitting: EMERGENCY MEDICINE
Payer: COMMERCIAL

## 2021-03-18 VITALS
BODY MASS INDEX: 29.84 KG/M2 | WEIGHT: 168.43 LBS | DIASTOLIC BLOOD PRESSURE: 80 MMHG | HEART RATE: 79 BPM | SYSTOLIC BLOOD PRESSURE: 108 MMHG | RESPIRATION RATE: 14 BRPM | TEMPERATURE: 98.1 F | OXYGEN SATURATION: 100 %

## 2021-03-18 DIAGNOSIS — R07.9 CHEST PAIN: Primary | ICD-10-CM

## 2021-03-18 DIAGNOSIS — K21.9 GASTROESOPHAGEAL REFLUX DISEASE, UNSPECIFIED WHETHER ESOPHAGITIS PRESENT: ICD-10-CM

## 2021-03-18 LAB
ALBUMIN SERPL BCP-MCNC: 4.2 G/DL (ref 3–5.2)
ALP SERPL-CCNC: 72 U/L (ref 43–122)
ALT SERPL W P-5'-P-CCNC: 25 U/L (ref 9–52)
AMPHETAMINES SERPL QL SCN: NEGATIVE
ANION GAP SERPL CALCULATED.3IONS-SCNC: 11 MMOL/L (ref 5–14)
AST SERPL W P-5'-P-CCNC: 23 U/L (ref 14–36)
ATRIAL RATE: 89 BPM
BARBITURATES UR QL: NEGATIVE
BASOPHILS # BLD AUTO: 0.1 THOUSANDS/ΜL (ref 0–0.1)
BASOPHILS NFR BLD AUTO: 0 % (ref 0–1)
BENZODIAZ UR QL: NEGATIVE
BILIRUB SERPL-MCNC: 0.3 MG/DL
BILIRUB UR QL STRIP: NEGATIVE
BUN SERPL-MCNC: 9 MG/DL (ref 5–25)
CALCIUM SERPL-MCNC: 9.7 MG/DL (ref 8.4–10.2)
CHLORIDE SERPL-SCNC: 104 MMOL/L (ref 97–108)
CLARITY UR: CLEAR
CO2 SERPL-SCNC: 24 MMOL/L (ref 22–30)
COCAINE UR QL: NEGATIVE
COLOR UR: YELLOW
CREAT SERPL-MCNC: 0.64 MG/DL (ref 0.6–1.2)
EOSINOPHIL # BLD AUTO: 0.1 THOUSAND/ΜL (ref 0–0.4)
EOSINOPHIL NFR BLD AUTO: 1 % (ref 0–6)
ERYTHROCYTE [DISTWIDTH] IN BLOOD BY AUTOMATED COUNT: 12.9 %
EXT PREG TEST URINE: NEGATIVE
EXT. CONTROL ED NAV: NORMAL
GFR SERPL CREATININE-BSD FRML MDRD: 121 ML/MIN/1.73SQ M
GLUCOSE SERPL-MCNC: 99 MG/DL (ref 70–99)
GLUCOSE UR STRIP-MCNC: NEGATIVE MG/DL
HCT VFR BLD AUTO: 37.9 % (ref 36–46)
HGB BLD-MCNC: 12.7 G/DL (ref 12–16)
HGB UR QL STRIP.AUTO: NEGATIVE
KETONES UR STRIP-MCNC: NEGATIVE MG/DL
LEUKOCYTE ESTERASE UR QL STRIP: NEGATIVE
LIPASE SERPL-CCNC: 118 U/L (ref 23–300)
LYMPHOCYTES # BLD AUTO: 3.3 THOUSANDS/ΜL (ref 0.5–4)
LYMPHOCYTES NFR BLD AUTO: 26 % (ref 25–45)
MCH RBC QN AUTO: 28.1 PG (ref 26–34)
MCHC RBC AUTO-ENTMCNC: 33.4 G/DL (ref 31–36)
MCV RBC AUTO: 84 FL (ref 80–100)
METHADONE UR QL: NEGATIVE
MONOCYTES # BLD AUTO: 1.1 THOUSAND/ΜL (ref 0.2–0.9)
MONOCYTES NFR BLD AUTO: 9 % (ref 1–10)
NEUTROPHILS # BLD AUTO: 8.1 THOUSANDS/ΜL (ref 1.8–7.8)
NEUTS SEG NFR BLD AUTO: 64 % (ref 45–65)
NITRITE UR QL STRIP: NEGATIVE
OPIATES UR QL SCN: NEGATIVE
OXYCODONE+OXYMORPHONE UR QL SCN: NEGATIVE
P AXIS: 46 DEGREES
PCP UR QL: NEGATIVE
PH UR STRIP.AUTO: 5 [PH]
PLATELET # BLD AUTO: 336 THOUSANDS/UL (ref 150–450)
PMV BLD AUTO: 8.6 FL (ref 8.9–12.7)
POTASSIUM SERPL-SCNC: 3.9 MMOL/L (ref 3.6–5)
PR INTERVAL: 146 MS
PROT SERPL-MCNC: 8 G/DL (ref 5.9–8.4)
PROT UR STRIP-MCNC: NEGATIVE MG/DL
QRS AXIS: 26 DEGREES
QRSD INTERVAL: 84 MS
QT INTERVAL: 362 MS
QTC INTERVAL: 440 MS
RBC # BLD AUTO: 4.5 MILLION/UL (ref 4–5.2)
SODIUM SERPL-SCNC: 139 MMOL/L (ref 137–147)
SP GR UR STRIP.AUTO: 1.03 (ref 1–1.04)
T WAVE AXIS: 23 DEGREES
THC UR QL: NEGATIVE
TROPONIN I SERPL-MCNC: <0.01 NG/ML (ref 0–0.03)
UROBILINOGEN UA: NEGATIVE MG/DL
VENTRICULAR RATE: 89 BPM
WBC # BLD AUTO: 12.6 THOUSAND/UL (ref 4.5–11)

## 2021-03-18 PROCEDURE — 84484 ASSAY OF TROPONIN QUANT: CPT | Performed by: EMERGENCY MEDICINE

## 2021-03-18 PROCEDURE — 83690 ASSAY OF LIPASE: CPT | Performed by: EMERGENCY MEDICINE

## 2021-03-18 PROCEDURE — 71045 X-RAY EXAM CHEST 1 VIEW: CPT

## 2021-03-18 PROCEDURE — 93005 ELECTROCARDIOGRAM TRACING: CPT

## 2021-03-18 PROCEDURE — 80053 COMPREHEN METABOLIC PANEL: CPT | Performed by: EMERGENCY MEDICINE

## 2021-03-18 PROCEDURE — 81025 URINE PREGNANCY TEST: CPT | Performed by: EMERGENCY MEDICINE

## 2021-03-18 PROCEDURE — 85025 COMPLETE CBC W/AUTO DIFF WBC: CPT | Performed by: EMERGENCY MEDICINE

## 2021-03-18 PROCEDURE — 36415 COLL VENOUS BLD VENIPUNCTURE: CPT | Performed by: EMERGENCY MEDICINE

## 2021-03-18 PROCEDURE — 99284 EMERGENCY DEPT VISIT MOD MDM: CPT | Performed by: EMERGENCY MEDICINE

## 2021-03-18 PROCEDURE — 93010 ELECTROCARDIOGRAM REPORT: CPT | Performed by: INTERNAL MEDICINE

## 2021-03-18 PROCEDURE — 96374 THER/PROPH/DIAG INJ IV PUSH: CPT

## 2021-03-18 PROCEDURE — 99285 EMERGENCY DEPT VISIT HI MDM: CPT

## 2021-03-18 PROCEDURE — 96375 TX/PRO/DX INJ NEW DRUG ADDON: CPT

## 2021-03-18 PROCEDURE — 80307 DRUG TEST PRSMV CHEM ANLYZR: CPT | Performed by: EMERGENCY MEDICINE

## 2021-03-18 RX ORDER — PANTOPRAZOLE SODIUM 20 MG/1
20 TABLET, DELAYED RELEASE ORAL
Qty: 90 TABLET | Refills: 0 | Status: SHIPPED | OUTPATIENT
Start: 2021-03-18

## 2021-03-18 RX ORDER — KETOROLAC TROMETHAMINE 30 MG/ML
15 INJECTION, SOLUTION INTRAMUSCULAR; INTRAVENOUS ONCE
Status: COMPLETED | OUTPATIENT
Start: 2021-03-18 | End: 2021-03-18

## 2021-03-18 RX ADMIN — FAMOTIDINE 40 MG: 10 INJECTION INTRAVENOUS at 09:39

## 2021-03-18 RX ADMIN — KETOROLAC TROMETHAMINE 15 MG: 30 INJECTION, SOLUTION INTRAMUSCULAR; INTRAVENOUS at 08:58

## 2021-03-18 RX ADMIN — LIDOCAINE HYDROCHLORIDE 10 ML: 20 SOLUTION ORAL; TOPICAL at 09:39

## 2021-03-18 NOTE — ED NOTES
"I don't have stomach pain" pt questioning why medications given       Himanshu Manzo, [place]  03/18/21 [id number]

## 2021-03-18 NOTE — ED PROVIDER NOTES
History  Chief Complaint   Patient presents with    Chest Pain     pt states around 0100 when driving home she started getting "chest/back pain" that is worse when she breathes in        Chest Pain  Pain location:  Substernal area  Pain quality: not aching    Pain radiates to:  Does not radiate  Pain radiates to the back: no    Pain severity:  Mild  Onset quality:  Sudden  Duration:  7 hours  Date/time of last known well:  3/18/2021 1:00 AM  Timing:  Intermittent  Progression:  Unchanged  Chronicity:  New  Context: breathing and movement    Relieved by:  Certain positions  Ineffective treatments: tylenol  Associated symptoms: back pain    Associated symptoms: no abdominal pain, no anorexia, no claudication, no cough, no diaphoresis, no dizziness, no dysphagia, no fever, no headache, no lower extremity edema, no nausea, no near-syncope, no numbness, no orthopnea, no palpitations, no shortness of breath, no syncope, not vomiting and no weakness    Risk factors: no aortic disease, no birth control, no coronary artery disease, no diabetes mellitus, no Jaimee-Danlos syndrome, no high cholesterol, no hypertension, no immobilization, not male, no Marfan's syndrome, not pregnant, no prior DVT/PE, no smoking and no surgery        Prior to Admission Medications   Prescriptions Last Dose Informant Patient Reported?  Taking?   acetaminophen (TYLENOL) 500 mg tablet 3/18/2021 at Unknown time  No Yes   Sig: Take 1 tablet (500 mg total) by mouth every 6 (six) hours as needed for mild pain or fever   cyclobenzaprine (FLEXERIL) 10 mg tablet   No No   Sig: Take 1 tablet (10 mg total) by mouth 2 (two) times a day as needed for muscle spasms   Patient not taking: Reported on 3/1/2021   dicyclomine (BENTYL) 20 mg tablet   No No   Sig: Take 1 tablet (20 mg total) by mouth 2 (two) times a day   Patient not taking: Reported on 3/1/2021   ibuprofen (MOTRIN) 600 mg tablet   No No   Sig: Take 1 tablet (600 mg total) by mouth every 6 (six) hours as needed for moderate pain   methocarbamol (ROBAXIN) 500 mg tablet   No No   Sig: Take 1 tablet (500 mg total) by mouth 4 (four) times a day   methylPREDNISolone 4 MG tablet therapy pack   Yes No   Sig: Take as directed per package instructions  pantoprazole (PROTONIX) 20 mg tablet   No No   Sig: Take 1 tablet (20 mg total) by mouth daily before breakfast   pantoprazole (PROTONIX) 20 mg tablet   No No   Sig: Take 1 tablet (20 mg total) by mouth daily before breakfast      Facility-Administered Medications: None       Past Medical History:   Diagnosis Date    Known health problems: none        Past Surgical History:   Procedure Laterality Date    CHOLECYSTECTOMY      NO PAST SURGERIES         History reviewed  No pertinent family history  I have reviewed and agree with the history as documented  E-Cigarette/Vaping    E-Cigarette Use Never User      E-Cigarette/Vaping Substances    Nicotine No     THC No     CBD No     Flavoring No     Other No     Unknown No      Social History     Tobacco Use    Smoking status: Never Smoker    Smokeless tobacco: Never Used    Tobacco comment: NO TOBACCO USE   Substance Use Topics    Alcohol use: No    Drug use: No       Review of Systems   Constitutional: Negative  Negative for chills, diaphoresis and fever  HENT: Negative  Negative for rhinorrhea, sore throat, trouble swallowing and voice change  Eyes: Negative  Negative for pain and visual disturbance  Respiratory: Negative  Negative for cough, shortness of breath and wheezing  Cardiovascular: Positive for chest pain  Negative for palpitations, orthopnea, claudication, syncope and near-syncope  Gastrointestinal: Negative for abdominal pain, anorexia, diarrhea, nausea and vomiting  Genitourinary: Negative  Negative for dysuria and frequency  Musculoskeletal: Positive for back pain  Negative for neck pain and neck stiffness  Skin: Negative  Negative for rash     Neurological: Negative  Negative for dizziness, speech difficulty, weakness, light-headedness, numbness and headaches  Physical Exam  Physical Exam  Vitals signs and nursing note reviewed  Constitutional:       General: She is not in acute distress  Appearance: She is well-developed  HENT:      Head: Normocephalic and atraumatic  Eyes:      Conjunctiva/sclera: Conjunctivae normal       Pupils: Pupils are equal, round, and reactive to light  Neck:      Musculoskeletal: Normal range of motion and neck supple  Trachea: No tracheal deviation  Cardiovascular:      Rate and Rhythm: Normal rate and regular rhythm  Pulmonary:      Effort: Pulmonary effort is normal  No respiratory distress  Breath sounds: Normal breath sounds  No wheezing or rales  Abdominal:      General: Bowel sounds are normal  There is no distension  Palpations: Abdomen is soft  Tenderness: There is no abdominal tenderness  There is no guarding or rebound  Musculoskeletal: Normal range of motion  General: No tenderness or deformity  Skin:     General: Skin is warm and dry  Capillary Refill: Capillary refill takes less than 2 seconds  Findings: No rash  Neurological:      Mental Status: She is alert and oriented to person, place, and time     Psychiatric:         Behavior: Behavior normal          Vital Signs  ED Triage Vitals   Temperature Pulse Respirations Blood Pressure SpO2   03/18/21 0810 03/18/21 0810 03/18/21 0810 03/18/21 0810 03/18/21 0810   98 1 °F (36 7 °C) 87 18 109/71 99 %      Temp Source Heart Rate Source Patient Position - Orthostatic VS BP Location FiO2 (%)   03/18/21 0810 03/18/21 0810 03/18/21 0810 03/18/21 0810 --   Tympanic Monitor Sitting Left arm       Pain Score       03/18/21 0858       8           Vitals:    03/18/21 0810 03/18/21 0922 03/18/21 1012 03/18/21 1108   BP: 109/71 117/73 110/73 108/80   Pulse: 87 91 76 79   Patient Position - Orthostatic VS: Sitting Lying Lying Lying         Visual Acuity      ED Medications  Medications   ketorolac (TORADOL) injection 15 mg (15 mg Intravenous Given 3/18/21 0858)   famotidine (PEPCID) injection 40 mg (40 mg Intravenous Given 3/18/21 0939)   al Zorita Frederick oxide-diphenhydramine-lidocaine viscous (MAGIC MOUTHWASH) suspension 10 mL (10 mL Swish & Swallow Given 3/18/21 0939)       Diagnostic Studies  Results Reviewed     Procedure Component Value Units Date/Time    Rapid drug screen, urine [[id number]]  (Normal) Collected: 03/18/21 0854    Lab Status: Final result Specimen: Urine, Clean Catch Updated: 03/18/21 1004     Amph/Meth UR Negative     Barbiturate Ur Negative     Benzodiazepine Urine Negative     Cocaine Urine Negative     Methadone Urine Negative     Opiate Urine Negative     PCP Ur Negative     THC Urine Negative     Oxycodone Urine Negative    Narrative:      FOR MEDICAL PURPOSES ONLY  IF CONFIRMATION NEEDED PLEASE CONTACT THE LAB WITHIN 5 DAYS      Drug Screen Cutoff Levels:  AMPHETAMINE/METHAMPHETAMINES  1000 ng/mL  BARBITURATES     200 ng/mL  BENZODIAZEPINES     200 ng/mL  COCAINE      300 ng/mL  METHADONE      300 ng/mL  OPIATES      300 ng/mL  PHENCYCLIDINE     25 ng/mL  THC       50 ng/mL  OXYCODONE      100 ng/mL    Troponin I [[id number]]  (Normal) Collected: 03/18/21 0854    Lab Status: Final result Specimen: Blood from Arm, Right Updated: 03/18/21 0956     Troponin I <0 01 ng/mL     Lipase [[id number]]  (Normal) Collected: 03/18/21 0854    Lab Status: Final result Specimen: Blood from Arm, Right Updated: 03/18/21 0939     Lipase 118 u/L     Comprehensive metabolic panel [[id number]]  (Normal) Collected: 03/18/21 0854    Lab Status: Final result Specimen: Blood from Arm, Right Updated: 03/18/21 0939     Sodium 139 mmol/L      Potassium 3 9 mmol/L      Chloride 104 mmol/L      CO2 24 mmol/L      ANION GAP 11 mmol/L      BUN 9 mg/dL      Creatinine 0 64 mg/dL      Glucose 99 mg/dL      Calcium 9 7 mg/dL      AST 23 U/L      ALT 25 U/L Alkaline Phosphatase 72 U/L      Total Protein 8 0 g/dL      Albumin 4 2 g/dL      Total Bilirubin 0 30 mg/dL      eGFR 121 ml/min/1 73sq m     Narrative:      National Kidney Disease Foundation guidelines for Chronic Kidney Disease (CKD):     Stage 1 with normal or high GFR (GFR > 90 mL/min/1 73 square meters)    Stage 2 Mild CKD (GFR = 60-89 mL/min/1 73 square meters)    Stage 3A Moderate CKD (GFR = 45-59 mL/min/1 73 square meters)    Stage 3B Moderate CKD (GFR = 30-44 mL/min/1 73 square meters)    Stage 4 Severe CKD (GFR = 15-29 mL/min/1 73 square meters)    Stage 5 End Stage CKD (GFR <15 mL/min/1 73 square meters)  Note: GFR calculation is accurate only with a steady state creatinine    UA (URINE) with reflex to Scope [[id number]]  (Normal) Collected: 03/18/21 0854    Lab Status: Final result Specimen: Urine, Clean Catch Updated: 03/18/21 0937     Color, UA Yellow     Clarity, UA Clear     Specific [place], UA 1 030     pH, UA 5 0     Leukocytes, UA Negative     Nitrite, UA Negative     Protein, UA Negative mg/dl      Glucose, UA Negative mg/dl      Ketones, UA Negative mg/dl      Bilirubin, UA Negative     Blood, UA Negative     UROBILINOGEN UA Negative mg/dL     CBC and differential [[id number]]  (Abnormal) Collected: 03/18/21 0854    Lab Status: Final result Specimen: Blood from Arm, Right Updated: 03/18/21 0933     WBC 12 60 Thousand/uL      RBC 4 50 Million/uL      Hemoglobin 12 7 g/dL      Hematocrit 37 9 %      MCV 84 fL      MCH 28 1 pg      MCHC 33 4 g/dL      RDW 12 9 %      MPV 8 6 fL      Platelets 710 Thousands/uL      Neutrophils Relative 64 %      Lymphocytes Relative 26 %      Monocytes Relative 9 %      Eosinophils Relative 1 %      Basophils Relative 0 %      Neutrophils Absolute 8 10 Thousands/µL      Lymphocytes Absolute 3 30 Thousands/µL      Monocytes Absolute 1 10 Thousand/µL      Eosinophils Absolute 0 10 Thousand/µL      Basophils Absolute 0 10 Thousands/µL     POCT pregnancy, urine [[id number]]  (Normal) Resulted: 03/18/21 0855    Lab Status: Final result Updated: 03/18/21 0855     EXT PREG TEST UR (Ref: Negative) negative     Control valid                 XR chest 1 view portable   Final Result by Ricki Funk MD (03/18 1004)      No acute cardiopulmonary disease  Workstation performed: [id number]                    Procedures  Procedures         ED Course             HEART Risk Score      Most Recent Value   Heart Score Risk Calculator   History  1 Filed at: 03/18/2021 0957   ECG  0 Filed at: 03/18/2021 0957   Age  0 Filed at: 03/18/2021 0957   Risk Factors  0 Filed at: 03/18/2021 0957   Troponin  0 Filed at: 03/18/2021 0957   HEART Score  1 Filed at: 03/18/2021 0957                      SBIRT 22yo+      Most Recent Value   SBIRT (23 yo +)   In order to provide better care to our patients, we are screening all of our patients for alcohol and drug use  Would it be okay to ask you these screening questions? Yes Filed at: 03/18/2021 0818   Initial Alcohol Screen: US AUDIT-C    1  How often do you have a drink containing alcohol?  0 Filed at: 03/18/2021 0818   2  How many drinks containing alcohol do you have on a typical day you are drinking? 0 Filed at: 03/18/2021 0818   3a  Male UNDER 65: How often do you have five or more drinks on one occasion? 0 Filed at: 03/18/2021 0818   3b  FEMALE Any Age, or MALE 65+: How often do you have 4 or more drinks on one occassion? 0 Filed at: 03/18/2021 0818   Audit-C Score  0 Filed at: 03/18/2021 3283   BETSY: How many times in the past year have you    Used an illegal drug or used a prescription medication for non-medical reasons? Never Filed at: 03/18/2021 0818                    MDM  Number of Diagnoses or Management Options  Chest pain:   Diagnosis management comments: 35year old female with chest and back pain that onset while driving home  No previous hx of CP  No trauma, rashes appreciated   Low risk for heart disease based on medical history, non-smoker and familial history (mom has occasional fast heart rate, but early cardiac death in immediate family)  Will obtain imaging and blood work  Pain present since 1AM, well within expected time of troponin to be positive  Single trop ordered, EKG reviewed and okay  Pain is reproducible touching anterior chest and back  Pulses symmetric in all four extremities and patient is not marfanoid in appearance  Patient cleared from PE by Perc criteria  I have reviewed any of the patient's vist and any testing done in the emergency department  They have verbalized their understanding of any testing done today and have no further questions or concerns regarding their care in the emergency room  They will follow up with their primary care physician as well as with any specialist in their discharge instructions  Strict return precautions were discussed  Amount and/or Complexity of Data Reviewed  Clinical lab tests: ordered and reviewed  Tests in the radiology section of CPT®: ordered and reviewed  Tests in the medicine section of CPT®: ordered and reviewed  Independent visualization of images, tracings, or specimens: yes        Disposition  Final diagnoses:   Chest pain     Time reflects when diagnosis was documented in both MDM as applicable and the Disposition within this note     Time User Action Codes Description Comment    3/18/2021  9:58 AM Bryce Brake Add [R07 9] Chest pain     3/18/2021  9:58 AM Bryce Brake Add [K21 9] Gastroesophageal reflux disease, unspecified whether esophagitis present     3/18/2021 10:05 AM Bryce Brake Modify [K21 9] Gastroesophageal reflux disease, unspecified whether esophagitis present       ED Disposition     ED Disposition Condition Date/Time Comment    Discharge Stable Thu Mar 18, 2021  9:57 AM Lacie Zamudio discharge to home/self care              Follow-up Information     Follow up With Specialties Details Why Contact Info Additional [address] In 1 week  [address], [address] [facility] [address]  [address], [address], [address] [place], [place], [address]    [address] [place] [address] Cardiology Schedule an appointment as soon as possible for a visit   [facility] [address]  [address], [address], [place], [place], [address] [phone]          Current Discharge Medication List      CONTINUE these medications which have CHANGED    Details   pantoprazole (PROTONIX) 20 mg tablet Take 1 tablet (20 mg total) by mouth daily before breakfast  Qty: 90 tablet, Refills: 0    Associated Diagnoses: Gastroesophageal reflux disease, unspecified whether esophagitis present         CONTINUE these medications which have NOT CHANGED    Details   acetaminophen (TYLENOL) 500 mg tablet Take 1 tablet (500 mg total) by mouth every 6 (six) hours as needed for mild pain or fever  Qty: 30 tablet, Refills: 0    Associated Diagnoses: Upper respiratory infection      cyclobenzaprine (FLEXERIL) 10 mg tablet Take 1 tablet (10 mg total) by mouth 2 (two) times a day as needed for muscle spasms  Qty: 12 tablet, Refills: 0    Associated Diagnoses: Strain of neck muscle, initial encounter      dicyclomine (BENTYL) 20 mg tablet Take 1 tablet (20 mg total) by mouth 2 (two) times a day  Qty: 20 tablet, Refills: 0    Associated Diagnoses: Abdominal pain      ibuprofen (MOTRIN) 600 mg tablet Take 1 tablet (600 mg total) by mouth every 6 (six) hours as needed for moderate pain  Qty: 30 tablet, Refills: 0    Associated Diagnoses: Strain of neck muscle, initial encounter      methocarbamol (ROBAXIN) 500 mg tablet Take 1 tablet (500 mg total) by mouth 4 (four) times a day  Qty: 28 tablet, Refills: 0    Associated Diagnoses: Rib pain on right side methylPREDNISolone 4 MG tablet therapy pack Take as directed per package instructions  No discharge procedures on file      PDMP Review     None          ED Provider  Electronically Signed by           Talia Chavarria DO  03/18/21 3329

## 2021-03-18 NOTE — ED NOTES
Pat states pain down to 7/10, pt laying on stretcher with kness bend up and one leg over the other, pt on cell phone and watching tv       José Manuel Rose RN  03/18/21 1012

## 2021-03-18 NOTE — Clinical Note
Simon Choi was seen and treated in our emergency department on 3/18/2021  Diagnosis:     Pema    She may return on this date: 03/21/2021         If you have any questions or concerns, please don't hesitate to call        Dunia Reynolds DO    ______________________________           _______________          _______________  Hospital Representative                              Date                                Time

## 2021-03-30 ENCOUNTER — OFFICE VISIT (OUTPATIENT)
Dept: FAMILY MEDICINE CLINIC | Facility: CLINIC | Age: 31
End: 2021-03-30

## 2021-03-30 VITALS
BODY MASS INDEX: 29.77 KG/M2 | HEIGHT: 63 IN | SYSTOLIC BLOOD PRESSURE: 110 MMHG | OXYGEN SATURATION: 99 % | RESPIRATION RATE: 16 BRPM | DIASTOLIC BLOOD PRESSURE: 70 MMHG | WEIGHT: 168 LBS | TEMPERATURE: 97 F | HEART RATE: 98 BPM

## 2021-03-30 DIAGNOSIS — R10.11 RIGHT UPPER QUADRANT ABDOMINAL PAIN: Primary | ICD-10-CM

## 2021-03-30 DIAGNOSIS — E66.3 OVERWEIGHT: ICD-10-CM

## 2021-03-30 DIAGNOSIS — M54.9 MID BACK PAIN: ICD-10-CM

## 2021-03-30 DIAGNOSIS — R10.13 EPIGASTRIC ABDOMINAL PAIN: ICD-10-CM

## 2021-03-30 PROBLEM — M54.6 ACUTE MIDLINE THORACIC BACK PAIN: Status: ACTIVE | Noted: 2021-03-30

## 2021-03-30 PROCEDURE — 99214 OFFICE O/P EST MOD 30 MIN: CPT | Performed by: PHYSICIAN ASSISTANT

## 2021-03-30 PROCEDURE — 1036F TOBACCO NON-USER: CPT | Performed by: PHYSICIAN ASSISTANT

## 2021-03-30 PROCEDURE — 3008F BODY MASS INDEX DOCD: CPT | Performed by: PHYSICIAN ASSISTANT

## 2021-03-30 RX ORDER — SUCRALFATE ORAL 1 G/10ML
1 SUSPENSION ORAL 4 TIMES DAILY
Qty: 420 ML | Refills: 0 | Status: SHIPPED | OUTPATIENT
Start: 2021-03-30

## 2021-03-30 NOTE — ASSESSMENT & PLAN NOTE
Recommend consult with GI  To continue pantoprazole  Rx carafate also  Stool h  Pylori also ordered  Recommend avoiding NSAIDS as they havent helped

## 2021-03-30 NOTE — ASSESSMENT & PLAN NOTE
BMI Counseling: Body mass index is 29 76 kg/m²  The BMI is above normal  Nutrition recommendations include 3-5 servings of fruits/vegetables daily, reducing intake of saturated fat and trans fat and reducing intake of cholesterol

## 2021-03-30 NOTE — PROGRESS NOTES
Assessment/Plan:    Rib pain on right side  Recommend consult with GI  To continue pantoprazole  Rx carafate also  Stool h  Pylori also ordered  Recommend avoiding NSAIDS as they havent helped  Acute midline thoracic back pain  Does not seem related to back  Recommend lidocaine patches  Recommend stopping NSAIDs to see if this ismaking abdomen pain worse    Overweight  BMI Counseling: Body mass index is 29 76 kg/m²  The BMI is above normal  Nutrition recommendations include 3-5 servings of fruits/vegetables daily, reducing intake of saturated fat and trans fat and reducing intake of cholesterol  1  Abdominal pain   Refer to gastroenterology  Ordered H  Pylori stool antigen  Prescribed Carafate and lidocaine cream       Diagnoses and all orders for this visit:    Right upper quadrant abdominal pain  -     Ambulatory referral to Gastroenterology; Future  -     H  pylori antigen, stool; Future  -     sucralfate (CARAFATE) 1 g/10 mL suspension; Take 10 mL (1 g total) by mouth 4 (four) times a day    Epigastric abdominal pain  -     Ambulatory referral to Gastroenterology; Future  -     H  pylori antigen, stool; Future  -     sucralfate (CARAFATE) 1 g/10 mL suspension; Take 10 mL (1 g total) by mouth 4 (four) times a day    Mid back pain  -     Lidocaine-Menthol 4-1 % PTCH; Apply 1 each topically daily    Overweight          Subjective:      Patient ID: Nohemy Erickson is a 35 y o  female presenting for abdominal pain x 1 month  She is here to get her chest x-ray results and also that her stomach and back pain continues  She was in the ED on 03/18/21 for this pain and was discharged with Robaxin  She was seen here for this on 3/1  She has tried Tylenol and ibuprofen too but it hasn't helped  Robaxin is not helping as well  She describes the pain as pressure and feels as if she's "inflammated" inside  The pain is in the epigastric and RUQ area  The pain occurs at any moment and is worse with inspiration  No cough or wheeze  She feels it is worse if standing straight also  She states it is not like acid reflux and is not related to food intake  She admits to loss of appetite  She denies constipation and has bowel movements once a day  She does not endorse any changes to her stool including bleeding  She also denies N/V/D  Patient states this pain has been going on for 4 months but it is more frequent now and the pain is worse  She rates the pain a 7/10 today  She denies drinking alcohol and smoking  [employer]  [employer] used  HPI    The following portions of the patient's history were reviewed and updated as appropriate:   She  has a past medical history of Known health problems: none  She   Patient Active Problem List    Diagnosis Date Noted    Acute midline thoracic back pain 03/30/2021    Overweight 03/30/2021    Rib pain on right side 03/01/2021    Gastroesophageal reflux disease 03/01/2021    Fatigue 07/20/2020    Sinusitis 12/18/2019    Diarrhea 04/08/2019    Amenorrhea 08/17/2018    Acute bilateral low back pain with sciatica 08/17/2018    Vasomotor rhinitis 08/17/2018    Chronic mastoiditis 03/16/2016     She  has a past surgical history that includes No past surgeries and Cholecystectomy  Her family history is not on file  She  reports that she has never smoked  She has never used smokeless tobacco  She reports that she does not drink alcohol or use drugs    Current Outpatient Medications   Medication Sig Dispense Refill    methocarbamol (ROBAXIN) 500 mg tablet Take 1 tablet (500 mg total) by mouth 4 (four) times a day 28 tablet 0    pantoprazole (PROTONIX) 20 mg tablet Take 1 tablet (20 mg total) by mouth daily before breakfast 90 tablet 0    Lidocaine-Menthol 4-1 % PTCH Apply 1 each topically daily 30 patch 0    sucralfate (CARAFATE) 1 g/10 mL suspension Take 10 mL (1 g total) by mouth 4 (four) times a day 420 mL 0     No current facility-administered medications for this visit  Current Outpatient Medications on File Prior to Visit   Medication Sig    methocarbamol (ROBAXIN) 500 mg tablet Take 1 tablet (500 mg total) by mouth 4 (four) times a day    pantoprazole (PROTONIX) 20 mg tablet Take 1 tablet (20 mg total) by mouth daily before breakfast    [DISCONTINUED] ibuprofen (MOTRIN) 600 mg tablet Take 1 tablet (600 mg total) by mouth every 6 (six) hours as needed for moderate pain    [DISCONTINUED] acetaminophen (TYLENOL) 500 mg tablet Take 1 tablet (500 mg total) by mouth every 6 (six) hours as needed for mild pain or fever (Patient not taking: Reported on 3/30/2021)    [DISCONTINUED] cyclobenzaprine (FLEXERIL) 10 mg tablet Take 1 tablet (10 mg total) by mouth 2 (two) times a day as needed for muscle spasms (Patient not taking: Reported on 3/1/2021)    [DISCONTINUED] dicyclomine (BENTYL) 20 mg tablet Take 1 tablet (20 mg total) by mouth 2 (two) times a day (Patient not taking: Reported on 3/1/2021)    [DISCONTINUED] methylPREDNISolone 4 MG tablet therapy pack Take as directed per package instructions  No current facility-administered medications on file prior to visit  She is allergic to iodine; augmentin [amoxicillin-pot clavulanate]; and shellfish-derived products       Review of Systems   Constitutional: Positive for chills and fatigue  Negative for fever  Respiratory: Negative for cough and shortness of breath  Cardiovascular: Negative for chest pain and palpitations  Gastrointestinal: Positive for abdominal pain  Negative for constipation, diarrhea, nausea and vomiting  Genitourinary: Negative for difficulty urinating and dysuria  Musculoskeletal: Positive for arthralgias and myalgias  Neurological: Negative for dizziness and headaches  Psychiatric/Behavioral: Negative for dysphoric mood  The patient is not nervous/anxious            Objective:      /70 (BP Location: Left arm, Patient Position: Sitting, Cuff Size: Large)   Pulse 98   Temp (!) 97 °F (36 1 °C) (Temporal)   Resp 16   Ht 5' 3" (1 6 m)   Wt 76 2 kg (168 lb)   LMP 03/22/2021 (Exact Date)   SpO2 99%   Breastfeeding No   BMI 29 76 kg/m²          Physical Exam  Constitutional:       General: She is not in acute distress  Appearance: Normal appearance  She is not ill-appearing or toxic-appearing  HENT:      Head: Normocephalic and atraumatic  Cardiovascular:      Rate and Rhythm: Normal rate and regular rhythm  Pulses: Normal pulses  Heart sounds: Normal heart sounds  No murmur  No friction rub  No gallop  Pulmonary:      Effort: Pulmonary effort is normal  No respiratory distress  Breath sounds: Normal breath sounds  No wheezing, rhonchi or rales  Abdominal:      General: Bowel sounds are normal  There is no distension  Palpations: Abdomen is soft  There is no mass  Tenderness: There is abdominal tenderness (epigastric and RUQ)  There is no guarding or rebound  Musculoskeletal:         General: Tenderness (thoracic T5-7) present  Neurological:      Mental Status: She is alert and oriented to person, place, and time     Psychiatric:         Mood and Affect: Mood normal          Behavior: Behavior normal

## 2021-03-30 NOTE — ASSESSMENT & PLAN NOTE
Does not seem related to back  Recommend lidocaine patches    Recommend stopping NSAIDs to see if this ismaking abdomen pain worse

## 2021-04-08 ENCOUNTER — PREP FOR PROCEDURE (OUTPATIENT)
Dept: GASTROENTEROLOGY | Facility: CLINIC | Age: 31
End: 2021-04-08

## 2021-04-08 ENCOUNTER — OFFICE VISIT (OUTPATIENT)
Dept: GASTROENTEROLOGY | Facility: CLINIC | Age: 31
End: 2021-04-08
Payer: COMMERCIAL

## 2021-04-08 VITALS
WEIGHT: 170 LBS | TEMPERATURE: 98.1 F | SYSTOLIC BLOOD PRESSURE: 114 MMHG | BODY MASS INDEX: 30.12 KG/M2 | DIASTOLIC BLOOD PRESSURE: 78 MMHG | HEART RATE: 76 BPM | HEIGHT: 63 IN

## 2021-04-08 DIAGNOSIS — R10.11 RIGHT UPPER QUADRANT ABDOMINAL PAIN: Primary | ICD-10-CM

## 2021-04-08 DIAGNOSIS — R10.13 EPIGASTRIC ABDOMINAL PAIN: ICD-10-CM

## 2021-04-08 DIAGNOSIS — K59.00 CONSTIPATION: ICD-10-CM

## 2021-04-08 PROCEDURE — 99244 OFF/OP CNSLTJ NEW/EST MOD 40: CPT | Performed by: INTERNAL MEDICINE

## 2021-04-08 PROCEDURE — 3008F BODY MASS INDEX DOCD: CPT | Performed by: INTERNAL MEDICINE

## 2021-04-08 PROCEDURE — 1036F TOBACCO NON-USER: CPT | Performed by: INTERNAL MEDICINE

## 2021-04-08 PROCEDURE — 3008F BODY MASS INDEX DOCD: CPT | Performed by: PHYSICIAN ASSISTANT

## 2021-04-08 RX ORDER — POLYETHYLENE GLYCOL 3350 17 G/17G
17 POWDER, FOR SOLUTION ORAL DAILY
Qty: 30 EACH | Refills: 0 | Status: SHIPPED | OUTPATIENT
Start: 2021-04-08 | End: 2021-08-09

## 2021-04-08 RX ORDER — POLYETHYLENE GLYCOL 3350 17 G/17G
17 POWDER, FOR SOLUTION ORAL DAILY
Qty: 30 EACH | Refills: 0 | Status: SHIPPED | OUTPATIENT
Start: 2021-04-08 | End: 2021-04-08

## 2021-04-08 NOTE — PROGRESS NOTES
Consultation -  [facility]  Felicia Yepez 35 y o  female MRN: [id number]  Unit/Bed#:  Encounter: [id number]        Consults    Reason for Consult / Principal Problem:   1  RUQ/epigastric pain  2  Constipation    ASSESSMENT AND PLAN:    Felicia Yepez is a 35 y o  old female with PMH: GERD, s/p cholecystectomy who presented with 1 month history of worsening abdominal pain located in the RUQ/epigastric region with associated mid thoracic back pain  Pain not resolved with PPI and sucralfate  Reports feeling like she has a mass in the RUQ  Lab work unremarkable  Differential includes post-cholecystectomy syndrome, constipation, ulcer    - start miralax daily   - CT A/P - no recent imaging in the past year  Reports feeling mass in the recent 3 months   - EGD - discussed with patient the need for endoscopy  - H  Pylori pending    ______________________________________________________________________    HPI:    Felicia Yepez is a 35 y o  old female with PMH: GERD, s/p cholecystectomy who presented with 1 month history of abdominal pain located in the RUQ  Describes pain as pressure like with associated mid thoracic back pain  Pain occurs daily where it was previously intermittent  Not associated with food intake  She has history of GERD but states this pain is different from her GERD symptoms  Reports foods sometimes feels stuck in the epigastric region  Currently on pantoprazole and sucralfate which has not helped  Reports feeling like there is a mass in the RUQ  Reports bowel movement 1-2 times weekly  Lab work reviewed from recent ED visit for similar complaint  Non-smoker and does not drink alcohol  Surgical history significant for cholecystectomy   used during encounter  REVIEW OF SYSTEMS:    CONSTITUTIONAL: Denies any fever, chills, rigors, and weight loss  HEENT: No earache or tinnitus  Denies hearing loss or visual disturbances    CARDIOVASCULAR: No chest pain or palpitations  RESPIRATORY: Denies any cough, hemoptysis, shortness of breath or dyspnea on exertion  GASTROINTESTINAL: As noted in the History of Present Illness  GENITOURINARY: No problems with urination  Denies any hematuria or dysuria  NEUROLOGIC: No dizziness or vertigo, denies headaches  MUSCULOSKELETAL: Denies any muscle or joint pain  Reports back pain  SKIN: Denies skin rashes or itching  ENDOCRINE: Denies excessive thirst  Denies intolerance to heat or cold  PSYCHOSOCIAL: Denies depression or anxiety  Denies any recent memory loss  Historical Information   Past Medical History:   Diagnosis Date    Known health problems: none      Past Surgical History:   Procedure Laterality Date    CHOLECYSTECTOMY      NO PAST SURGERIES       Social History   Social History     Substance and Sexual Activity   Alcohol Use No     Social History     Substance and Sexual Activity   Drug Use No     Social History     Tobacco Use   Smoking Status Never Smoker   Smokeless Tobacco Never Used   Tobacco Comment    NO TOBACCO USE     History reviewed  No pertinent family history  Meds/Allergies     (Not in a hospital admission)    No current facility-administered medications for this visit  Allergies   Allergen Reactions    Iodine - Food Allergy Hives    Augmentin [Amoxicillin-Pot Clavulanate]      Epistaxis     Shellfish-Derived Products - Food Allergy Itching       Objective     Blood pressure 114/78, pulse 76, temperature 98 1 °F (36 7 °C), temperature source Tympanic, height 5' 3" (1 6 m), weight 77 1 kg (170 lb), last menstrual period 03/22/2021, not currently breastfeeding  Body mass index is 30 11 kg/m²  [unfilled]    PHYSICAL EXAM:      General Appearance:   Alert, cooperative, no distress   HEENT:   Normocephalic, atraumatic, anicteric       Neck:   Supple, symmetrical, trachea midline   Lungs:   Clear to auscultation bilaterally; no rales, rhonchi or wheezing; respirations unlabored    Heart: Regular rate and rhythm; no murmur, rub, or gallop  Abdomen:   Soft, tender, non-distended; normal bowel sounds; no organomegaly; post-surgical scars healed    Genitalia:   Deferred    Rectal:   Deferred    Extremities:  No cyanosis, clubbing or edema    Pulses:  2+ and symmetric all extremities    Skin:  No jaundice, rashes, or lesions    Lymph nodes:  No palpable cervical lymphadenopathy      Lab Results:   No visits with results within 1 Day(s) from this visit     Latest known visit with results is:   Admission on 03/18/2021, Discharged on 03/18/2021   Component Date Value    EXT PREG TEST UR (Ref: N* 03/18/2021 negative     Control 03/18/2021 valid     Amph/Meth UR 03/18/2021 Negative     Barbiturate Ur 03/18/2021 Negative     Benzodiazepine Urine 03/18/2021 Negative     Cocaine Urine 03/18/2021 Negative     Methadone Urine 03/18/2021 Negative     Opiate Urine 03/18/2021 Negative     PCP Ur 03/18/2021 Negative     THC Urine 03/18/2021 Negative     Oxycodone Urine 03/18/2021 Negative     Color, UA 03/18/2021 Yellow     Clarity, UA 03/18/2021 Clear     Specific Gravity, UA 03/18/2021 1 030     pH, UA 03/18/2021 5 0     Leukocytes, UA 03/18/2021 Negative     Nitrite, UA 03/18/2021 Negative     Protein, UA 03/18/2021 Negative     Glucose, UA 03/18/2021 Negative     Ketones, UA 03/18/2021 Negative     Bilirubin, UA 03/18/2021 Negative     Blood, UA 03/18/2021 Negative     UROBILINOGEN UA 03/18/2021 Negative     WBC 03/18/2021 12 60*    RBC 03/18/2021 4 50     Hemoglobin 03/18/2021 12 7     Hematocrit 03/18/2021 37 9     MCV 03/18/2021 84     MCH 03/18/2021 28 1     MCHC 03/18/2021 33 4     RDW 03/18/2021 12 9     MPV 03/18/2021 8 6*    Platelets 82/79/7663 336     Neutrophils Relative 03/18/2021 64     Lymphocytes Relative 03/18/2021 26     Monocytes Relative 03/18/2021 9     Eosinophils Relative 03/18/2021 1     Basophils Relative 03/18/2021 0     Neutrophils Absolute 03/18/2021 8 10*    Lymphocytes Absolute 03/18/2021 3 30     Monocytes Absolute 03/18/2021 1 10*    Eosinophils Absolute 03/18/2021 0 10     Basophils Absolute 03/18/2021 0 10     Sodium 03/18/2021 139     Potassium 03/18/2021 3 9     Chloride 03/18/2021 104     CO2 03/18/2021 24     ANION GAP 03/18/2021 11     BUN 03/18/2021 9     Creatinine 03/18/2021 0 64     Glucose 03/18/2021 99     Calcium 03/18/2021 9 7     AST 03/18/2021 23     ALT 03/18/2021 25     Alkaline Phosphatase 03/18/2021 72     Total Protein 03/18/2021 8 0     Albumin 03/18/2021 4 2     Total Bilirubin 03/18/2021 0 30     eGFR 03/18/2021 121     Lipase 03/18/2021 118     Troponin I 03/18/2021 <0 01     Ventricular Rate 03/18/2021 89     Atrial Rate 03/18/2021 89     LA Interval 03/18/2021 146     QRSD Interval 03/18/2021 84     QT Interval 03/18/2021 362     QTC Interval 03/18/2021 440     P Axis 03/18/2021 46     QRS Axis 03/18/2021 26     T Wave Axis 03/18/2021 23        Imaging Studies: I have personally reviewed pertinent imaging studies  Xr Chest 1 View Portable    Result Date: 3/18/2021  Narrative: CHEST INDICATION:   chest pain  COMPARISON:  Chest radiograph from 3/1/2021 and chest CT from 3/16/2020  EXAM PERFORMED/VIEWS:  XR CHEST PORTABLE  FINDINGS: Cardiomediastinal silhouette normal  Lungs clear  No effusion or pneumothorax  Cholecystectomy  Osseous structures normal for age  Impression: No acute cardiopulmonary disease  Workstation performed: [id number]         Portions of this note may have been created with voice recognition software  Occasional wrong word or sound a like substitutions may have occurred due to inherent limitations of voice recognition software  Read the chart carefully and recognize, using context, where substitutions have occurred      ---------------------------------------------------  Note Electronically Signed By:    Mitch Pickering 15 Internal Medicine Residency PGY-1

## 2021-04-08 NOTE — LETTER
April 9, 2021     Meenu Saldana PA-C  [address]  [address] [email or web address]    Patient: Skylar Merida   YOB: 1990   Date of Visit: 4/8/2021       Dear Dr Frances Mae: Thank you for referring Roxana Muñoz to me for evaluation  Below are my notes for this consultation  If you have questions, please do not hesitate to call me  I look forward to following your patient along with you  Sincerely,        Rick Leyva MD        CC: No Recipients  Toby Herreraix,   4/8/2021  3:46 PM  Cosign Needed  Consultation - [address] [facility]  Skylar Merida 35 y o  female MRN: [id number]  Unit/Bed#:  Encounter: [id number]        [address]     Reason for Consult / Principal Problem:   1  RUQ/epigastric pain  2  Constipation    ASSESSMENT AND PLAN:    Skylar Merida is a 35 y o  old female with PMH: GERD, s/p cholecystectomy who presented with 1 month history of worsening abdominal pain located in the RUQ/epigastric region with associated mid thoracic back pain  Pain not resolved with PPI and sucralfate  Reports feeling like she has a mass in the RUQ  Lab work unremarkable  Differential includes post-cholecystectomy syndrome, constipation, ulcer    - start miralax daily   - CT A/P - no recent imaging in the past year  Reports feeling mass in the recent 3 months   - EGD - discussed with patient the need for endoscopy  - H  Pylori pending    ______________________________________________________________________    HPI:    Skylar Merida is a 35 y o  old female with PMH: GERD, s/p cholecystectomy who presented with 1 month history of abdominal pain located in the RUQ  Describes pain as pressure like with associated mid thoracic back pain  Pain occurs daily where it was previously intermittent  Not associated with food intake  She has history of GERD but states this pain is different from her GERD symptoms  Reports foods sometimes feels stuck in the epigastric region   Currently on pantoprazole and sucralfate which has not helped  Reports feeling like there is a mass in the RUQ  Reports bowel movement 1-2 times weekly  Lab work reviewed from recent ED visit for similar complaint  Non-smoker and does not drink alcohol  Surgical history significant for cholecystectomy   used during encounter  REVIEW OF SYSTEMS:    CONSTITUTIONAL: Denies any fever, chills, rigors, and weight loss  HEENT: No earache or tinnitus  Denies hearing loss or visual disturbances  CARDIOVASCULAR: No chest pain or palpitations  RESPIRATORY: Denies any cough, hemoptysis, shortness of breath or dyspnea on exertion  GASTROINTESTINAL: As noted in the History of Present Illness  GENITOURINARY: No problems with urination  Denies any hematuria or dysuria  NEUROLOGIC: No dizziness or vertigo, denies headaches  MUSCULOSKELETAL: Denies any muscle or joint pain  Reports back pain  SKIN: Denies skin rashes or itching  ENDOCRINE: Denies excessive thirst  Denies intolerance to heat or cold  PSYCHOSOCIAL: Denies depression or anxiety  Denies any recent memory loss  Historical Information   Past Medical History:   Diagnosis Date    Known health problems: none      Past Surgical History:   Procedure Laterality Date    CHOLECYSTECTOMY      NO PAST SURGERIES       Social History   Social History     Substance and Sexual Activity   Alcohol Use No     Social History     Substance and Sexual Activity   Drug Use No     Social History     Tobacco Use   Smoking Status Never Smoker   Smokeless Tobacco Never Used   Tobacco Comment    NO TOBACCO USE     History reviewed  No pertinent family history  Meds/Allergies     (Not in a hospital admission)    No current facility-administered medications for this visit  Allergies   Allergen Reactions    Iodine - Food Allergy Hives    Augmentin [Amoxicillin-Pot Clavulanate]      Epistaxis        Shellfish-Derived Products - Food Allergy Itching Objective     Blood pressure 114/78, pulse 76, temperature 98 1 °F (36 7 °C), temperature source Tympanic, height 5' 3" (1 6 m), weight 77 1 kg (170 lb), last menstrual period 03/22/2021, not currently breastfeeding  Body mass index is 30 11 kg/m²  [unfilled]    PHYSICAL EXAM:      General Appearance:   Alert, cooperative, no distress   HEENT:   Normocephalic, atraumatic, anicteric  Neck:   Supple, symmetrical, trachea midline   Lungs:   Clear to auscultation bilaterally; no rales, rhonchi or wheezing; respirations unlabored    Heart:   Regular rate and rhythm; no murmur, rub, or gallop  Abdomen:   Soft, tender, non-distended; normal bowel sounds; no organomegaly; post-surgical scars healed    Genitalia:   Deferred    Rectal:   Deferred    Extremities:  No cyanosis, clubbing or edema    Pulses:  2+ and symmetric all extremities    Skin:  No jaundice, rashes, or lesions    Lymph nodes:  No palpable cervical lymphadenopathy      Lab Results:   No visits with results within 1 Day(s) from this visit     Latest known visit with results is:   Admission on 03/18/2021, Discharged on 03/18/2021   Component Date Value    EXT PREG TEST UR (Ref: N* 03/18/2021 negative     Control 03/18/2021 valid     Amph/Meth UR 03/18/2021 Negative     Barbiturate Ur 03/18/2021 Negative     Benzodiazepine Urine 03/18/2021 Negative     Cocaine Urine 03/18/2021 Negative     Methadone Urine 03/18/2021 Negative     Opiate Urine 03/18/2021 Negative     PCP Ur 03/18/2021 Negative     THC Urine 03/18/2021 Negative     Oxycodone Urine 03/18/2021 Negative     Color, UA 03/18/2021 Yellow     Clarity, UA 03/18/2021 Clear     Specific Gravity, UA 03/18/2021 1 030     pH, UA 03/18/2021 5 0     Leukocytes, UA 03/18/2021 Negative     Nitrite, UA 03/18/2021 Negative     Protein, UA 03/18/2021 Negative     Glucose, UA 03/18/2021 Negative     Ketones, UA 03/18/2021 Negative     Bilirubin, UA 03/18/2021 Negative     Blood, UA 03/18/2021 Negative     UROBILINOGEN UA 03/18/2021 Negative     WBC 03/18/2021 12 60*    RBC 03/18/2021 4 50     Hemoglobin 03/18/2021 12 7     Hematocrit 03/18/2021 37 9     MCV 03/18/2021 84     MCH 03/18/2021 28 1     MCHC 03/18/2021 33 4     RDW 03/18/2021 12 9     MPV 03/18/2021 8 6*    Platelets 43/00/0719 336     Neutrophils Relative 03/18/2021 64     Lymphocytes Relative 03/18/2021 26     Monocytes Relative 03/18/2021 9     Eosinophils Relative 03/18/2021 1     Basophils Relative 03/18/2021 0     Neutrophils Absolute 03/18/2021 8 10*    Lymphocytes Absolute 03/18/2021 3 30     Monocytes Absolute 03/18/2021 1 10*    Eosinophils Absolute 03/18/2021 0 10     Basophils Absolute 03/18/2021 0 10     Sodium 03/18/2021 139     Potassium 03/18/2021 3 9     Chloride 03/18/2021 104     CO2 03/18/2021 24     ANION GAP 03/18/2021 11     BUN 03/18/2021 9     Creatinine 03/18/2021 0 64     Glucose 03/18/2021 99     Calcium 03/18/2021 9 7     AST 03/18/2021 23     ALT 03/18/2021 25     Alkaline Phosphatase 03/18/2021 72     Total Protein 03/18/2021 8 0     Albumin 03/18/2021 4 2     Total Bilirubin 03/18/2021 0 30     eGFR 03/18/2021 121     Lipase 03/18/2021 118     Troponin I 03/18/2021 <0 01     Ventricular Rate 03/18/2021 89     Atrial Rate 03/18/2021 89     IL Interval 03/18/2021 146     QRSD Interval 03/18/2021 84     QT Interval 03/18/2021 362     QTC Interval 03/18/2021 440     P Axis 03/18/2021 46     QRS Axis 03/18/2021 26     T Wave Axis 03/18/2021 23        Imaging Studies: I have personally reviewed pertinent imaging studies  Xr Chest 1 View Portable    Result Date: 3/18/2021  Narrative: CHEST INDICATION:   chest pain  COMPARISON:  Chest radiograph from 3/1/2021 and chest CT from 3/16/2020  EXAM PERFORMED/VIEWS:  XR CHEST PORTABLE  FINDINGS: Cardiomediastinal silhouette normal  Lungs clear  No effusion or pneumothorax  Cholecystectomy   Osseous structures normal for age  Impression: No acute cardiopulmonary disease  Workstation performed: [id number]         Portions of this note may have been created with voice recognition software  Occasional wrong word or sound a like substitutions may have occurred due to inherent limitations of voice recognition software  Read the chart carefully and recognize, using context, where substitutions have occurred      ---------------------------------------------------  Note Electronically Signed By:    Mitch Juarez 15 Internal Medicine Residency PGY-1

## 2021-04-14 ENCOUNTER — OFFICE VISIT (OUTPATIENT)
Dept: FAMILY MEDICINE CLINIC | Facility: CLINIC | Age: 31
End: 2021-04-14

## 2021-04-14 DIAGNOSIS — B34.9 VIRAL INFECTION, UNSPECIFIED: ICD-10-CM

## 2021-04-14 DIAGNOSIS — B34.9 VIRAL INFECTION, UNSPECIFIED: Primary | ICD-10-CM

## 2021-04-14 PROCEDURE — U0003 INFECTIOUS AGENT DETECTION BY NUCLEIC ACID (DNA OR RNA); SEVERE ACUTE RESPIRATORY SYNDROME CORONAVIRUS 2 (SARS-COV-2) (CORONAVIRUS DISEASE [COVID-19]), AMPLIFIED PROBE TECHNIQUE, MAKING USE OF HIGH THROUGHPUT TECHNOLOGIES AS DESCRIBED BY CMS-2020-01-R: HCPCS | Performed by: PHYSICIAN ASSISTANT

## 2021-04-14 PROCEDURE — 1036F TOBACCO NON-USER: CPT | Performed by: PHYSICIAN ASSISTANT

## 2021-04-14 PROCEDURE — U0005 INFEC AGEN DETEC AMPLI PROBE: HCPCS | Performed by: PHYSICIAN ASSISTANT

## 2021-04-14 PROCEDURE — 99213 OFFICE O/P EST LOW 20 MIN: CPT | Performed by: PHYSICIAN ASSISTANT

## 2021-04-14 RX ORDER — GUAIFENESIN 600 MG
1200 TABLET, EXTENDED RELEASE 12 HR ORAL EVERY 12 HOURS SCHEDULED
Qty: 30 TABLET | Refills: 0 | Status: SHIPPED | OUTPATIENT
Start: 2021-04-14 | End: 2021-08-09

## 2021-04-14 RX ORDER — ACETAMINOPHEN 500 MG
500 TABLET ORAL EVERY 6 HOURS PRN
Qty: 30 TABLET | Refills: 0 | Status: SHIPPED | OUTPATIENT
Start: 2021-04-14 | End: 2022-08-08

## 2021-04-14 NOTE — PROGRESS NOTES
COVID-19 Outpatient Progress Note    Assessment/Plan:    Problem List Items Addressed This Visit     None      Visit Diagnoses     Viral infection, unspecified    -  Primary    Relevant Medications    guaiFENesin (MUCINEX) 600 mg 12 hr tablet    acetaminophen (TYLENOL) 500 mg tablet    Other Relevant Orders    Novel Coronavirus (Covid-19),PCR [facility] - Collected at   [facility] or [facility]         Disposition:     I referred patient to one of our centralized sites for a COVID-19 swab  - Advised patient to self quarantine at home until test is resulted  - Advised patient to continue to follow good infection control measures including frequent hand washing, wearing a mask when around others, and to practice social distancing     - Advised patient to call the office or report to ED if symptoms persist, worsen, or new symptoms arise such as worsening shortness of breath or difficulty breathing  I have spent 13 minutes directly with the patient  Greater than 50% of this time was spent in counseling/coordination of care regarding: patient and family education and risk factor reductions  COVID-19 Counseling Check-List    *Discuss the need for immediate isolation, even before results of the test are available  *Advise patients to inform their immediate household/contacts that they may wish to be tested and quarantined as well   Review locations and people they have been in contact with in the past two weeks  *Review the signs and symptoms of COVID-19  *Inform patients that if positive, they will likely be contacted by a public health worker and asked to provide a list of the people they've been with for contact tracing, encourage them to 'answer the call'  *Discuss services that might help the patient successfully isolate and quarantine at home      Encounter provider Jeanette Summers PA-C    Provider located at [place] [id number]  [phone]    Recent Visits  No visits were found meeting these conditions  Showing recent visits within past 7 days and meeting all other requirements     Today's Visits  Date Type Provider Dept   04/14/21 Office Visit [facility]   Showing today's visits and meeting all other requirements     Future Appointments  No visits were found meeting these conditions  Showing future appointments within next 150 days and meeting all other requirements        Patient agrees to participate in a virtual check in via telephone or video visit instead of presenting to the office to address urgent/immediate medical needs  Patient is aware this is a billable service  After connecting through Telephone, the patient was identified by name and date of birth  Sergio Hernandez was informed that this was a telemedicine visit and that the exam was being conducted confidentially over secure lines  My office door was closed  No one else was in the room  Sergio Hernandez acknowledged consent and understanding of privacy and security of the telemedicine visit  I informed the patient that I have reviewed her record in Epic and presented the opportunity for her to ask any questions regarding the visit today  The patient agreed to participate  It was my intent to perform this visit via video technology but the patient was not able to do a video connection so the visit was completed via audio telephone only  Subjective:   Sergio Hernandez is a 35 y o  female who is concerned about COVID-19  Patient's symptoms include chills, fatigue, nasal congestion, sore throat, myalgias and headache  Patient denies fever, rhinorrhea, anosmia, loss of taste, cough, shortness of breath, chest tightness, abdominal pain, nausea, vomiting and diarrhea       Date of symptom onset: 4/13/2021    Exposure:   Contact with a person who is under investigation (PUI) for or who is positive for COVID-19 within the last 14 days?: No    Hospitalized recently for fever and/or lower respiratory symptoms?: No      Currently a healthcare worker that is involved in direct patient care?: No      Works in a special setting where the risk of COVID-19 transmission may be high? (this may include long-term care, correctional and penitentiary facilities; homeless shelters; assisted-living facilities and group homes ): No      Resident in a special setting where the risk of COVID-19 transmission may be high? (this may include long-term care, correctional and penitentiary facilities; homeless shelters; assisted-living facilities and group homes ): No      Patient notes she has been taking TheraFlu with little relief  Lab Results   Component Value Date    SARSCOV2 Not Detected 12/30/2020    [facility] [address] Not Detected 03/23/2021     Past Medical History:   Diagnosis Date    Known health problems: none      Past Surgical History:   Procedure Laterality Date    CHOLECYSTECTOMY      NO PAST SURGERIES       Current Outpatient Medications   Medication Sig Dispense Refill    acetaminophen (TYLENOL) 500 mg tablet Take 1 tablet (500 mg total) by mouth every 6 (six) hours as needed for mild pain or headaches 30 tablet 0    guaiFENesin (MUCINEX) 600 mg 12 hr tablet Take 2 tablets (1,200 mg total) by mouth every 12 (twelve) hours 30 tablet 0    Lidocaine-Menthol 4-1 % PTCH Apply 1 each topically daily 30 patch 0    methocarbamol (ROBAXIN) 500 mg tablet Take 1 tablet (500 mg total) by mouth 4 (four) times a day 28 tablet 0    pantoprazole (PROTONIX) 20 mg tablet Take 1 tablet (20 mg total) by mouth daily before breakfast 90 tablet 0    polyethylene glycol (MIRALAX) 17 g packet Take 17 g by mouth daily 30 each 0    sucralfate (CARAFATE) 1 g/10 mL suspension Take 10 mL (1 g total) by mouth 4 (four) times a day 420 mL 0     No current facility-administered medications for this visit        Allergies   Allergen Reactions    Iodine - Food Allergy Hives    Augmentin [Amoxicillin-Pot Clavulanate]      Epistaxis     Shellfish-Derived Products - Food Allergy Itching       Review of Systems   Constitutional: Positive for chills and fatigue  Negative for fever  HENT: Positive for congestion and sore throat  Negative for rhinorrhea  Respiratory: Negative for cough, chest tightness and shortness of breath  Gastrointestinal: Negative for abdominal pain, diarrhea, nausea and vomiting  Musculoskeletal: Positive for myalgias  Neurological: Positive for headaches  Objective: There were no vitals filed for this visit  Physical Exam  Constitutional:       General: She is not in acute distress  Pulmonary:      Effort: Pulmonary effort is normal  No respiratory distress  Neurological:      Mental Status: She is alert and oriented to person, place, and time  Psychiatric:         Speech: Speech normal           - Utilized [employer] phones for translation  VIRTUAL VISIT DISCLAIMER    Aden Jean acknowledges that she has consented to an online visit or consultation  She understands that the online visit is based solely on information provided by her, and that, in the absence of a face-to-face physical evaluation by the physician, the diagnosis she receives is both limited and provisional in terms of accuracy and completeness  This is not intended to replace a full medical face-to-face evaluation by the physician  Aden Jean understands and accepts these terms

## 2021-04-15 LAB — SARS-COV-2 RNA RESP QL NAA+PROBE: NEGATIVE

## 2021-04-16 ENCOUNTER — TELEPHONE (OUTPATIENT)
Dept: FAMILY MEDICINE CLINIC | Facility: CLINIC | Age: 31
End: 2021-04-16

## 2021-04-16 DIAGNOSIS — J02.9 ACUTE PHARYNGITIS, UNSPECIFIED ETIOLOGY: Primary | ICD-10-CM

## 2021-04-16 NOTE — TELEPHONE ENCOUNTER
Nadege Maria had called in reference to her ear pain and sore throat   She had tested negative for covid and wanted to have you call in antibiotics for her   I did offer appt but she said if she can get the antiobiotics so she does not have to come in

## 2021-04-19 RX ORDER — AZITHROMYCIN 250 MG/1
TABLET, FILM COATED ORAL
Qty: 6 TABLET | Refills: 0 | Status: SHIPPED | OUTPATIENT
Start: 2021-04-19 | End: 2021-04-24

## 2021-04-20 ENCOUNTER — TELEPHONE (OUTPATIENT)
Dept: GASTROENTEROLOGY | Facility: CLINIC | Age: 31
End: 2021-04-20

## 2021-04-20 NOTE — TELEPHONE ENCOUNTER
[facility] Gastroenterology Nurse             We received notice from [facility] that the ct abdomen pelvis w contrast requires additional information   They are looking to see if patient had   Most recent results pertaining to Current abdomen ultrasound/x-ray  I did not see in chart, can you please confirm if any of these studies has been preformed  Please advise patient of next step, if these studies were not preformed first this will delay this auth to get approved or may require a peer to peer on why this was not completed   If this case is urgent, you may speak with a   Clinician at [phone]   TRACKING NUMBER: [id number]

## 2021-04-22 ENCOUNTER — TRANSCRIBE ORDERS (OUTPATIENT)
Dept: GASTROENTEROLOGY | Facility: CLINIC | Age: 31
End: 2021-04-22

## 2021-04-28 ENCOUNTER — HOSPITAL ENCOUNTER (OUTPATIENT)
Dept: CT IMAGING | Facility: HOSPITAL | Age: 31
Discharge: HOME | End: 2021-04-28
Payer: COMMERCIAL

## 2021-04-28 DIAGNOSIS — R10.13 EPIGASTRIC ABDOMINAL PAIN: ICD-10-CM

## 2021-04-28 DIAGNOSIS — R10.11 RIGHT UPPER QUADRANT ABDOMINAL PAIN: ICD-10-CM

## 2021-04-28 PROCEDURE — 74150 CT ABDOMEN W/O CONTRAST: CPT

## 2021-04-28 PROCEDURE — G1004 CDSM NDSC: HCPCS

## 2021-05-04 ENCOUNTER — PREP FOR PROCEDURE (OUTPATIENT)
Dept: GASTROENTEROLOGY | Facility: CLINIC | Age: 31
End: 2021-05-04

## 2021-05-05 ENCOUNTER — IMMUNIZATIONS (OUTPATIENT)
Dept: FAMILY MEDICINE CLINIC | Facility: HOSPITAL | Age: 31
End: 2021-05-05

## 2021-05-05 DIAGNOSIS — Z23 ENCOUNTER FOR IMMUNIZATION: Primary | ICD-10-CM

## 2021-05-05 PROCEDURE — 0011A SARS-COV-2 / COVID-19 MRNA VACCINE (MODERNA) 100 MCG: CPT

## 2021-05-05 PROCEDURE — 91301 SARS-COV-2 / COVID-19 MRNA VACCINE (MODERNA) 100 MCG: CPT

## 2021-05-13 RX ORDER — SODIUM CHLORIDE 9 MG/ML
100 INJECTION, SOLUTION INTRAVENOUS CONTINUOUS
Status: CANCELLED | OUTPATIENT
Start: 2021-05-13

## 2021-05-14 ENCOUNTER — HOSPITAL ENCOUNTER (OUTPATIENT)
Dept: GASTROENTEROLOGY | Facility: HOSPITAL | Age: 31
Setting detail: OUTPATIENT SURGERY
Discharge: HOME | End: 2021-05-14
Attending: INTERNAL MEDICINE

## 2021-05-14 DIAGNOSIS — R10.11 RIGHT UPPER QUADRANT ABDOMINAL PAIN: ICD-10-CM

## 2021-05-14 DIAGNOSIS — R10.13 EPIGASTRIC ABDOMINAL PAIN: ICD-10-CM

## 2021-05-18 ENCOUNTER — HOSPITAL ENCOUNTER (OUTPATIENT)
Dept: GASTROENTEROLOGY | Facility: HOSPITAL | Age: 31
Setting detail: OUTPATIENT SURGERY
Discharge: HOME | End: 2021-05-18
Attending: INTERNAL MEDICINE

## 2021-05-18 RX ORDER — SODIUM CHLORIDE 9 MG/ML
125 INJECTION, SOLUTION INTRAVENOUS CONTINUOUS
OUTPATIENT
Start: 2021-05-18

## 2021-05-18 RX ORDER — LIDOCAINE HYDROCHLORIDE 10 MG/ML
0.5 INJECTION, SOLUTION EPIDURAL; INFILTRATION; INTRACAUDAL; PERINEURAL ONCE AS NEEDED
OUTPATIENT
Start: 2021-05-18

## 2021-06-02 ENCOUNTER — IMMUNIZATIONS (OUTPATIENT)
Dept: FAMILY MEDICINE CLINIC | Facility: HOSPITAL | Age: 31
End: 2021-06-02

## 2021-06-02 DIAGNOSIS — Z23 ENCOUNTER FOR IMMUNIZATION: Primary | ICD-10-CM

## 2021-06-02 PROCEDURE — 0012A SARS-COV-2 / COVID-19 MRNA VACCINE (MODERNA) 100 MCG: CPT

## 2021-06-02 PROCEDURE — 91301 SARS-COV-2 / COVID-19 MRNA VACCINE (MODERNA) 100 MCG: CPT

## 2021-07-29 ENCOUNTER — OFFICE VISIT (OUTPATIENT)
Dept: OBGYN CLINIC | Facility: CLINIC | Age: 31
End: 2021-07-29

## 2021-07-29 VITALS
HEART RATE: 76 BPM | BODY MASS INDEX: 31 KG/M2 | WEIGHT: 175 LBS | DIASTOLIC BLOOD PRESSURE: 73 MMHG | SYSTOLIC BLOOD PRESSURE: 118 MMHG

## 2021-07-29 DIAGNOSIS — R10.2 PELVIC PAIN: Primary | ICD-10-CM

## 2021-07-29 DIAGNOSIS — N91.2 AMENORRHEA: ICD-10-CM

## 2021-07-29 PROCEDURE — 99203 OFFICE O/P NEW LOW 30 MIN: CPT | Performed by: NURSE PRACTITIONER

## 2021-07-29 RX ORDER — MEDROXYPROGESTERONE ACETATE 10 MG/1
10 TABLET ORAL DAILY
Qty: 5 TABLET | Refills: 0 | Status: SHIPPED | OUTPATIENT
Start: 2021-07-29 | End: 2021-10-07 | Stop reason: SDUPTHER

## 2021-07-29 NOTE — PATIENT INSTRUCTIONS
Schedule pelvic U/S  Take Provera as direceted  Call if no period after last Provera pillReturn for pelvicU/S results and to F/U no periods with doctor  Schedule annual GYN exam after appointment to follow up no periods  Call with needs or concerns    COVID-19 Instructions    If you are having any of the following:  Cough   Shortness of breath   Fever  If traveled within past 2 weeks internationally or to high risk [place] states  Or been in contact with someone that has     Please call either:   Your PCP office  [phone], option [phone]    They will screen you over the phone and direct you to the nearest appropriate testing location    DO NOT go to your PCP or OB office without calling first

## 2021-07-29 NOTE — PROGRESS NOTES
Assessment/Plan:         Diagnoses and all orders for this visit:    Pelvic pain  -     US pelvis complete non OB; Future    Amenorrhea      Plan  Schedule pelvic U/S  Take Provera as directed  Call if no period 7 days after last Provera pill  Return for pelvicU/S results and to F/U no periods with doctor  Schedule annual GYN exam after appointment to follow up no periods  Call with needs or concerns  Pt verbalized understanding of all discussed  Subjective:      Patient ID: Bennett Edouard is a 28 y o  female  HPI  Pt presents with concerns she has not had a period in 6 monthhs and is concerned she has been having pelvic pain intermittently  Pt states prior to 6 months ago she was having a monthly period  Pt states last GYN care was > 4 years in [address] she has 1 partner since prior to the birth of her first child at age 16, she had children at age 16, 14 and 13 and then had a sterilization procedure at age 26  7/2020 pt had a WNL TSH  UPT today was negative    Explained a pelvic U/S was ordered  Explained safe and effective use of Provera to cause a withdrawal was ordered    The following portions of the patient's history were reviewed and updated as appropriate: allergies, current medications, past family history, past medical history, past social history, past surgical history and problem list     Review of Systems      Pertinent items are note in the HPI    Objective:      /73   Pulse 76   Wt 79 4 kg (175 lb)   BMI 31 00 kg/m²          Physical Exam  Vitals reviewed  Constitutional:       Appearance: Normal appearance  Eyes:      General:         Right eye: No discharge  Left eye: No discharge  Pulmonary:      Effort: No respiratory distress  Musculoskeletal:         General: Normal range of motion  Cervical back: Normal range of motion  Neurological:      Mental Status: She is alert and oriented to person, place, and time     Psychiatric:         Mood and Affect: Mood normal          Behavior: Behavior normal          Thought Content:  Thought content normal        Negative cough or SOB

## 2021-07-30 ENCOUNTER — HOSPITAL ENCOUNTER (OUTPATIENT)
Dept: ULTRASOUND IMAGING | Facility: HOSPITAL | Age: 31
Discharge: HOME | End: 2021-07-30
Payer: COMMERCIAL

## 2021-07-30 DIAGNOSIS — R10.2 PELVIC PAIN: ICD-10-CM

## 2021-07-30 PROCEDURE — 76856 US EXAM PELVIC COMPLETE: CPT

## 2021-07-30 PROCEDURE — 76830 TRANSVAGINAL US NON-OB: CPT

## 2021-08-09 ENCOUNTER — OFFICE VISIT (OUTPATIENT)
Dept: OBGYN CLINIC | Facility: CLINIC | Age: 31
End: 2021-08-09

## 2021-08-09 VITALS
SYSTOLIC BLOOD PRESSURE: 113 MMHG | DIASTOLIC BLOOD PRESSURE: 81 MMHG | BODY MASS INDEX: 31 KG/M2 | WEIGHT: 175 LBS | HEART RATE: 87 BPM

## 2021-08-09 DIAGNOSIS — L65.9 HAIR LOSS: ICD-10-CM

## 2021-08-09 DIAGNOSIS — Z71.2 ENCOUNTER TO DISCUSS TEST RESULTS: Primary | ICD-10-CM

## 2021-08-09 DIAGNOSIS — N83.202 LEFT OVARIAN CYST: ICD-10-CM

## 2021-08-09 PROCEDURE — 99213 OFFICE O/P EST LOW 20 MIN: CPT | Performed by: OBSTETRICS & GYNECOLOGY

## 2021-08-09 RX ORDER — SODIUM FLUORIDE 5 MG/G
CREAM DENTAL
COMMUNITY
Start: 2021-08-04

## 2021-08-09 RX ORDER — NAPROXEN 500 MG/1
500 TABLET ORAL 2 TIMES DAILY WITH MEALS
Qty: 20 TABLET | Refills: 6 | Status: SHIPPED | OUTPATIENT
Start: 2021-08-09 | End: 2022-08-08

## 2021-08-09 RX ORDER — IBUPROFEN 600 MG/1
600 TABLET ORAL EVERY 6 HOURS PRN
COMMUNITY
Start: 2021-08-04 | End: 2022-08-08

## 2021-08-09 NOTE — PROGRESS NOTES
Assessment/Plan:     No problem-specific Assessment & Plan notes found for this encounter  Diagnoses and all orders for this visit:    Encounter to discuss test results    Hair loss  -     TSH, 3rd generation with Free T4 reflex; Future    Left ovarian cyst  -     naproxen (EC NAPROSYN) 500 MG EC tablet; Take 1 tablet (500 mg total) by mouth 2 (two) times a day with meals for 5 days    Other orders  -     ibuprofen (MOTRIN) 600 mg tablet; Take 600 mg by mouth every 6 (six) hours as needed  -     Sodium Fluoride 5000 Plus 1 1 % CREA; BRUSH TWICE DAILY AFTER BREAKFAST AND BEFORE BEDTIME DO NOT RINSE FOR 30 MINS    Advised to try weight loss; return to her baseline weight  To consider OCPs if no spontaneous return to menses  RTO in 3 months for follow up  Subjective:      Patient ID: Yeni Rodriguez is a 28 y o  female presents for results  She missed her period x 6 months  She is having hair loss and increased acne  She responded to the provera is started bleeding yesterday  She did have a 15 lb weight gain  She has a tubal for birth control  She denies increased stress       UTERUS:  The uterus is anteverted in position, measuring 9 2 x 4 2 x 5 0 cm  Contour and echotexture appear normal   The cervix shows no suspicious abnormality      ENDOMETRIUM:    Normal caliber of 8 mm  Tiny cystic changes  Heterogeneous and normal in appearance      OVARIES/ADNEXA:  Right ovary:  2 8 x 1 3 x 2 5 cm  No suspicious right ovarian abnormality  Doppler flow within normal limits      Left ovary:  3 6 x 2 5 x 3 1 cm  No suspicious left ovarian abnormality  Large anechoic cyst measuring 2 7 x 1 9 x 2 2 cm with smaller eccentric daughter cysts  Doppler flow within normal limits      No suspicious adnexal mass or loculated collections  There is no free fluid      IMPRESSION:     Heterogeneous uterus with cystic areas questioning adenomyosis      Left adnexal cyst measuring 2 7 cm    No follow-up  HPI    The following portions of the patient's history were reviewed and updated as appropriate: allergies, current medications, past family history, past medical history, past social history, past surgical history and problem list     Review of Systems      Objective:      /81   Pulse 87   Wt 79 4 kg (175 lb)   BMI 31 00 kg/m²          Physical Exam  Vitals and nursing note reviewed  Constitutional:       Appearance: Normal appearance  Pulmonary:      Effort: Pulmonary effort is normal    Neurological:      General: No focal deficit present  Mental Status: She is alert and oriented to person, place, and time     Psychiatric:         Mood and Affect: Mood normal          Behavior: Behavior normal

## 2021-08-13 ENCOUNTER — APPOINTMENT (OUTPATIENT)
Dept: LAB | Facility: HOSPITAL | Age: 31
End: 2021-08-13
Attending: OBSTETRICS & GYNECOLOGY
Payer: COMMERCIAL

## 2021-08-13 DIAGNOSIS — L65.9 HAIR LOSS: ICD-10-CM

## 2021-08-13 LAB — TSH SERPL DL<=0.05 MIU/L-ACNC: 2.67 UIU/ML (ref 0.47–4.68)

## 2021-08-13 PROCEDURE — 84443 ASSAY THYROID STIM HORMONE: CPT

## 2021-08-13 PROCEDURE — 36415 COLL VENOUS BLD VENIPUNCTURE: CPT

## 2021-09-13 ENCOUNTER — TELEPHONE (OUTPATIENT)
Dept: OBGYN CLINIC | Facility: CLINIC | Age: 31
End: 2021-09-13

## 2021-10-07 ENCOUNTER — ANNUAL EXAM (OUTPATIENT)
Dept: OBGYN CLINIC | Facility: CLINIC | Age: 31
End: 2021-10-07

## 2021-10-07 VITALS
BODY MASS INDEX: 30.48 KG/M2 | HEART RATE: 78 BPM | DIASTOLIC BLOOD PRESSURE: 68 MMHG | HEIGHT: 63 IN | SYSTOLIC BLOOD PRESSURE: 116 MMHG | WEIGHT: 172 LBS

## 2021-10-07 DIAGNOSIS — Z01.419 ENCOUNTER FOR ANNUAL ROUTINE GYNECOLOGICAL EXAMINATION: Primary | ICD-10-CM

## 2021-10-07 DIAGNOSIS — N91.2 AMENORRHEA: ICD-10-CM

## 2021-10-07 PROCEDURE — 99395 PREV VISIT EST AGE 18-39: CPT | Performed by: NURSE PRACTITIONER

## 2021-10-07 PROCEDURE — G0145 SCR C/V CYTO,THINLAYER,RESCR: HCPCS | Performed by: NURSE PRACTITIONER

## 2021-10-07 PROCEDURE — G0476 HPV COMBO ASSAY CA SCREEN: HCPCS | Performed by: NURSE PRACTITIONER

## 2021-10-07 RX ORDER — MEDROXYPROGESTERONE ACETATE 10 MG/1
10 TABLET ORAL DAILY
Qty: 5 TABLET | Refills: 4 | Status: SHIPPED | OUTPATIENT
Start: 2021-10-07 | End: 2021-10-12

## 2021-10-11 LAB
HPV HR 12 DNA CVX QL NAA+PROBE: NEGATIVE
HPV16 DNA CVX QL NAA+PROBE: NEGATIVE
HPV18 DNA CVX QL NAA+PROBE: NEGATIVE

## 2021-10-13 LAB
LAB AP GYN PRIMARY INTERPRETATION: NORMAL
Lab: NORMAL

## 2022-02-04 ENCOUNTER — TELEPHONE (OUTPATIENT)
Dept: PHYSICAL THERAPY | Facility: OTHER | Age: 32
End: 2022-02-04

## 2022-02-04 NOTE — TELEPHONE ENCOUNTER
Called patient per online appointment request that was filled out  Voice message left for patient to call back  Phone number and hours of business provided

## 2022-07-25 ENCOUNTER — TELEPHONE (OUTPATIENT)
Dept: OBGYN CLINIC | Facility: CLINIC | Age: 32
End: 2022-07-25

## 2022-08-08 ENCOUNTER — OFFICE VISIT (OUTPATIENT)
Dept: FAMILY MEDICINE CLINIC | Facility: CLINIC | Age: 32
End: 2022-08-08

## 2022-08-08 VITALS
DIASTOLIC BLOOD PRESSURE: 88 MMHG | BODY MASS INDEX: 30.65 KG/M2 | HEART RATE: 71 BPM | HEIGHT: 63 IN | SYSTOLIC BLOOD PRESSURE: 120 MMHG | RESPIRATION RATE: 18 BRPM | WEIGHT: 173 LBS | OXYGEN SATURATION: 98 % | TEMPERATURE: 97.3 F

## 2022-08-08 DIAGNOSIS — J30.89 NON-SEASONAL ALLERGIC RHINITIS, UNSPECIFIED TRIGGER: Primary | ICD-10-CM

## 2022-08-08 PROCEDURE — 99213 OFFICE O/P EST LOW 20 MIN: CPT | Performed by: FAMILY MEDICINE

## 2022-08-08 RX ORDER — IBUPROFEN 600 MG/1
600 TABLET ORAL EVERY 8 HOURS SCHEDULED
Qty: 30 TABLET | Refills: 0 | Status: SHIPPED | OUTPATIENT
Start: 2022-08-08 | End: 2022-08-18

## 2022-08-08 RX ORDER — LORATADINE 10 MG/1
10 TABLET ORAL DAILY
Qty: 90 TABLET | Refills: 0 | Status: SHIPPED | OUTPATIENT
Start: 2022-08-08

## 2022-08-08 RX ORDER — SENNOSIDES 8.6 MG
650 CAPSULE ORAL EVERY 8 HOURS PRN
Qty: 30 TABLET | Refills: 0 | Status: SHIPPED | OUTPATIENT
Start: 2022-08-08 | End: 2022-08-08

## 2022-08-08 RX ORDER — SENNOSIDES 8.6 MG
650 CAPSULE ORAL EVERY 8 HOURS PRN
Qty: 30 TABLET | Refills: 0 | Status: SHIPPED | OUTPATIENT
Start: 2022-08-08 | End: 2022-08-18

## 2022-08-08 RX ORDER — FLUTICASONE PROPIONATE 50 MCG
2 SPRAY, SUSPENSION (ML) NASAL DAILY
Qty: 15.9 ML | Refills: 2 | Status: SHIPPED | OUTPATIENT
Start: 2022-08-08

## 2022-08-08 NOTE — PROGRESS NOTES
Assessment/Plan:    Non-seasonal allergic rhinitis  New onset of sinus pressure , pt with a hx of sinusitis treated with antibiotics back in 2019  Symptoms similar to previous episodes  Currently not on any medication   PE relevant for sinus tenderness and pale turbine, inflamed, positive for b/l polyps and redness      -Flunase 50 mcg two intranasal sprays per nare qd  -claritin 10 mg daily   -Good hydration, and nasal rinse bid-johnnie pod with distilled water(avoid tap water)   -Tylenol 650 mg q8hr prn for pain control   Ibuprofen 600 mg q8h scheduled x 10 days as antiinflammatory and analgesic  Reassess in two weeks or earlear as needed  Diagnoses and all orders for this visit:    Non-seasonal allergic rhinitis, unspecified trigger  -     fluticasone (FLONASE) 50 mcg/act nasal spray; 2 sprays into each nostril daily  -     loratadine (CLARITIN) 10 mg tablet; Take 1 tablet (10 mg total) by mouth daily          Subjective:      Patient ID: Rozina Heath is a 33 y o  female  Rozina Heath is a 33 y o   female, with a past medical history of sinusitis presents complaining of frontal and bilateral periorbital pressure that radiates to the back of her head  Patient reports that her symptoms are similar to previous episode of acute sinusitis although this time is different because does not have rhinorrhea or cough  Symptoms started 2 weeks ago, 8/10 in intensity, improved by Tylenol 500 mg, has taking 2 pills last time today at 12  Aggravated by laying down or leaning her head forward  Patient denies nausea vomiting, fever, chills, diarrhea, constipation, chest pain shortness of breath, head trauma, recent upper respiratory infection, weakness, sick contacts or recent travel  Patient's medical conditions are stable unless noted otherwise above   Patient has not had any recent hospitalizations, or medical emergencies since last visit   Overall patient reports feeling well except for her sinus pressure  Patient has no further complaints other than what is mentioned in the ROS  The following portions of the patient's history were reviewed and updated as appropriate: allergies, current medications, past family history, past medical history, past social history, past surgical history and problem list     Review of Systems   Constitutional: Negative for chills and fever  HENT: Negative for ear pain and sore throat  Eyes: Negative for pain and visual disturbance  Respiratory: Negative for cough and shortness of breath  Cardiovascular: Negative for chest pain and palpitations  Gastrointestinal: Negative for abdominal pain and vomiting  Genitourinary: Negative for dysuria and hematuria  Musculoskeletal: Negative for arthralgias and back pain  Skin: Negative for color change and rash  Neurological: Positive for headaches  Negative for seizures and syncope  All other systems reviewed and are negative  Objective:      /88 (BP Location: Left arm, Patient Position: Sitting, Cuff Size: Adult)   Pulse 71   Temp (!) 97 3 °F (36 3 °C) (Temporal)   Resp 18   Ht 5' 3" (1 6 m)   Wt 78 5 kg (173 lb)   SpO2 98%   BMI 30 65 kg/m²          Physical Exam  Vitals and nursing note reviewed  Constitutional:       General: She is awake  She is not in acute distress  Appearance: Normal appearance  She is well-developed and well-groomed  She is not ill-appearing, toxic-appearing or diaphoretic  HENT:      Head: Normocephalic and atraumatic  Nose: Nose normal       Mouth/Throat:      Mouth: Mucous membranes are moist       Pharynx: Oropharynx is clear  No oropharyngeal exudate or posterior oropharyngeal erythema  Eyes:      Extraocular Movements: Extraocular movements intact  Conjunctiva/sclera: Conjunctivae normal       Pupils: Pupils are equal, round, and reactive to light  Cardiovascular:      Rate and Rhythm: Normal rate and regular rhythm    No extrasystoles are present  Pulses: Normal pulses  Radial pulses are 2+ on the right side and 2+ on the left side  Heart sounds: Normal heart sounds, S1 normal and S2 normal    Pulmonary:      Effort: Pulmonary effort is normal       Breath sounds: Normal breath sounds and air entry  Abdominal:      General: Bowel sounds are normal       Palpations: Abdomen is soft  There is no mass  Tenderness: There is no abdominal tenderness  There is no right CVA tenderness, left CVA tenderness or guarding  Musculoskeletal:         General: Normal range of motion  Cervical back: Normal range of motion  Right lower leg: No edema  Left lower leg: No edema  Skin:     General: Skin is warm  Capillary Refill: Capillary refill takes less than 2 seconds  Coloration: Skin is not jaundiced or pale  Findings: No bruising, erythema, lesion or rash  Neurological:      General: No focal deficit present  Mental Status: She is alert  Psychiatric:         Behavior: Behavior is cooperative

## 2022-08-08 NOTE — ASSESSMENT & PLAN NOTE
New onset of sinus pressure , pt with a hx of sinusitis treated with antibiotics back in 2019  Symptoms similar to previous episodes  Currently not on any medication   PE relevant for sinus tenderness and pale turbine, inflamed, positive for b/l polyps and redness      -Flunase 50 mcg two intranasal sprays per nare qd  -claritin 10 mg daily   -Good hydration, and nasal rinse bid-johnnie pod with distilled water(avoid tap water)   -Tylenol 650 mg q8hr prn for pain control   Ibuprofen 600 mg q8h scheduled x 10 days as antiinflammatory and analgesic  Reassess in two weeks or earlear as needed

## 2022-08-11 DIAGNOSIS — L65.9 HAIR LOSS: Primary | ICD-10-CM

## 2022-08-24 ENCOUNTER — OFFICE VISIT (OUTPATIENT)
Dept: FAMILY MEDICINE CLINIC | Facility: CLINIC | Age: 32
End: 2022-08-24

## 2022-08-24 VITALS
HEART RATE: 75 BPM | DIASTOLIC BLOOD PRESSURE: 70 MMHG | OXYGEN SATURATION: 98 % | HEIGHT: 63 IN | TEMPERATURE: 97.6 F | RESPIRATION RATE: 14 BRPM | SYSTOLIC BLOOD PRESSURE: 98 MMHG | BODY MASS INDEX: 30.18 KG/M2 | WEIGHT: 170.3 LBS

## 2022-08-24 DIAGNOSIS — Z11.59 NEED FOR HEPATITIS C SCREENING TEST: ICD-10-CM

## 2022-08-24 DIAGNOSIS — Z11.4 SCREENING FOR HIV (HUMAN IMMUNODEFICIENCY VIRUS): ICD-10-CM

## 2022-08-24 DIAGNOSIS — R10.2 PELVIC PAIN: ICD-10-CM

## 2022-08-24 DIAGNOSIS — N91.2 AMENORRHEA: ICD-10-CM

## 2022-08-24 DIAGNOSIS — Z23 ENCOUNTER FOR IMMUNIZATION: ICD-10-CM

## 2022-08-24 DIAGNOSIS — M54.50 ACUTE LEFT-SIDED LOW BACK PAIN WITHOUT SCIATICA: ICD-10-CM

## 2022-08-24 DIAGNOSIS — N20.0 KIDNEY CALCULUS: ICD-10-CM

## 2022-08-24 DIAGNOSIS — J30.89 NON-SEASONAL ALLERGIC RHINITIS DUE TO OTHER ALLERGIC TRIGGER: Primary | ICD-10-CM

## 2022-08-24 PROBLEM — R19.7 DIARRHEA: Status: RESOLVED | Noted: 2019-04-08 | Resolved: 2022-08-24

## 2022-08-24 PROCEDURE — 99213 OFFICE O/P EST LOW 20 MIN: CPT | Performed by: FAMILY MEDICINE

## 2022-08-24 PROCEDURE — 90715 TDAP VACCINE 7 YRS/> IM: CPT | Performed by: FAMILY MEDICINE

## 2022-08-24 PROCEDURE — 90471 IMMUNIZATION ADMIN: CPT | Performed by: FAMILY MEDICINE

## 2022-08-24 RX ORDER — PSEUDOEPHEDRINE HYDROCHLORIDE 60 MG/1
60 TABLET, FILM COATED ORAL EVERY 4 HOURS PRN
Qty: 30 TABLET | Refills: 0 | Status: SHIPPED | OUTPATIENT
Start: 2022-08-24 | End: 2022-08-29

## 2022-08-24 RX ORDER — CETIRIZINE HYDROCHLORIDE 10 MG/1
10 TABLET ORAL DAILY
Qty: 30 TABLET | Refills: 3 | Status: SHIPPED | OUTPATIENT
Start: 2022-08-24

## 2022-08-24 NOTE — PROGRESS NOTES
Assessment/Plan:    Amenorrhea  - Last menstrual period one month ago after 1 year  - Continue follow up with GYN, next appointment in October  BMI 30 0-30 9,adult  - Will order CMP, A1c and Lipid panel today, to be done before next appointment  - Return in October for an Annual Visit  Non-seasonal allergic rhinitis  - Reports mild improvement with daily Claritin and Flonase  - Persistent sinus tenderness, however no fever, nasal discharge, nasal congestion, headaches, nausea, vomiting or dizziness    - No ear symptoms  - Will switch Claritin for Zyrtec 10 mg daily; advised patient to continue Flonase  Start Sudaphed 60 mg q4h PRN for up to 5 days to alleviate symptoms of sinus congestion    - Return in October or earlier if refractory symptoms despite treatment    Kidney calculus  - Previous CT scans done during ED visits show punctate nonobstructing left renal calculus  - Patient aware of these results and is concerned that lower back pain and pelvic pain are related to this calculus  - since last CT scan was done in 2021 and in view of lumbar pain, will order Ultrasound to reevaluate calculus  Left low back pain  - Left low back pain associated with movement and tenderness on palpation  - Most likely MSK related,   - However in view of patient's hx of left renal calculous and occasional dysuria, will go ahead with renal ultrasound to clarify if symptoms can be related to renal calculous  - Will order UA today to rule out UTI       Diagnoses and all orders for this visit:    Non-seasonal allergic rhinitis due to other allergic trigger  -     Discontinue: loratadine-pseudoephedrine (CLARITIN-D 12-HOUR) 5-120 mg per tablet; Take 1 tablet by mouth 2 (two) times a day  -     pseudoephedrine (SUDAFED) 60 mg tablet; Take 1 tablet (60 mg total) by mouth every 4 (four) hours as needed for congestion for up to 5 days  -     cetirizine (ZyrTEC) 10 mg tablet;  Take 1 tablet (10 mg total) by mouth daily    Need for hepatitis C screening test  -     Hepatitis C Antibody ([facility]); Future    Screening for HIV (human immunodeficiency virus)  -     HIV 1/2 Antigen/Antibody (4th Generation) w Reflex [facility]; Future    Encounter for immunization  -     TDAP VACCINE GREATER THAN OR EQUAL TO 6YO IM    BMI 30 0-30 9,adult  -     HEMOGLOBIN A1C W/ EAG ESTIMATION; Future  -     Lipid Panel with Direct LDL reflex; Future  -     Comprehensive metabolic panel; Future    Pelvic pain  -     Urinalysis with microscopic    Kidney calculus  -     US kidney and bladder; Future    Amenorrhea    Acute left-sided low back pain without sciatica          Subjective:      Patient ID: Merari Moreau is a 33 y o  female  This is a 34 yo female with pmh of obesity and allergies who comes today with complains of persistent sinus tenderness  She was seen 15 days ago with same complains and was diagnosed with non-seasonal allergies, for which she was prescribed daily Claritin and Flonase  She reports some mild improvement with medications, however tenderness is still bothersome and pressure is present when she lowers her head  She denies fever, nasal discharge, nasal congestion, headaches, nausea, vomiting, vertigo or ear problems  She also reports pressure sensation in her eyes and they are watery when she wakes up in the morning, in addition sore throat that resolves throughout the day  She also complains of left lower back pain that started months ago and was more occasioanal (every 3-6 months), but now she feels every time she moves or lay in this side  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, but does not feel the pain when working  She complains of lower abdominal pain now, with some dysuria, but no blood in urine                  The following portions of the patient's history were reviewed and updated as appropriate: allergies, current medications, past family history, past medical history, past social history, past surgical history and problem list     Review of Systems   Constitutional: Negative for chills and fever  HENT: Positive for sinus pressure and sore throat  Negative for ear pain, postnasal drip, rhinorrhea and sinus pain  Eyes: Negative for pain and visual disturbance  Respiratory: Negative for cough and shortness of breath  Cardiovascular: Negative for chest pain and palpitations  Gastrointestinal: Positive for abdominal pain  Negative for nausea and vomiting  Genitourinary: Negative for dysuria and hematuria  Musculoskeletal: Negative for arthralgias and back pain  Skin: Negative for color change and rash  Neurological: Negative for dizziness, seizures and syncope  All other systems reviewed and are negative  Objective:      BP 98/70 (BP Location: Right arm, Patient Position: Sitting, Cuff Size: Adult)   Pulse 75   Temp 97 6 °F (36 4 °C) (Temporal)   Resp 14   Ht 5' 3" (1 6 m)   Wt 77 2 kg (170 lb 4 8 oz)   LMP 07/15/2022 (Within Days)   SpO2 98%   BMI 30 17 kg/m²          Physical Exam  Constitutional:       General: She is not in acute distress  Appearance: Normal appearance  She is obese  She is not ill-appearing, toxic-appearing or diaphoretic  HENT:      Head: Normocephalic and atraumatic  Right Ear: Tympanic membrane, ear canal and external ear normal  There is no impacted cerumen  Left Ear: Tympanic membrane, ear canal and external ear normal  There is no impacted cerumen  Nose: Nose normal  No congestion or rhinorrhea  Mouth/Throat:      Mouth: Mucous membranes are moist    Eyes:      General:         Right eye: No discharge  Left eye: No discharge  Extraocular Movements: Extraocular movements intact  Conjunctiva/sclera: Conjunctivae normal    Cardiovascular:      Rate and Rhythm: Normal rate and regular rhythm  Pulmonary:      Effort: Pulmonary effort is normal  No respiratory distress     Abdominal:      General: Bowel sounds are normal       Palpations: Abdomen is soft  Tenderness: There is abdominal tenderness  There is no left CVA tenderness  Comments: Tenderness on pelvic palpation   Musculoskeletal:         General: No tenderness  Normal range of motion  Cervical back: Normal range of motion  Back:    Skin:     General: Skin is warm  Findings: No rash  Neurological:      General: No focal deficit present  Mental Status: She is alert and oriented to person, place, and time  Psychiatric:         Mood and Affect: Mood normal          Behavior: Behavior normal          Thought Content:  Thought content normal          Judgment: Judgment normal

## 2022-08-24 NOTE — ASSESSMENT & PLAN NOTE
- Last menstrual period one month ago after 1 year  - Continue follow up with GYN, next appointment in October

## 2022-08-25 PROBLEM — N20.0 KIDNEY CALCULUS: Status: ACTIVE | Noted: 2022-08-25

## 2022-08-25 PROBLEM — M54.50 LEFT LOW BACK PAIN: Status: ACTIVE | Noted: 2022-08-25

## 2022-08-25 NOTE — ASSESSMENT & PLAN NOTE
- Reports mild improvement with daily Claritin and Flonase  - Persistent sinus tenderness, however no fever, nasal discharge, nasal congestion, headaches, nausea, vomiting or dizziness    - No ear symptoms  - Will switch Claritin for Zyrtec 10 mg daily; advised patient to continue Flonase   Start Sudaphed 60 mg q4h PRN for up to 5 days to alleviate symptoms of sinus congestion    - Return in October or earlier if refractory symptoms despite treatment

## 2022-08-25 NOTE — ASSESSMENT & PLAN NOTE
- Previous CT scans done during ED visits show punctate nonobstructing left renal calculus  - Patient aware of these results and is concerned that lower back pain and pelvic pain are related to this calculus  - since last CT scan was done in 2021 and in view of lumbar pain, will order Ultrasound to reevaluate calculus

## 2022-08-25 NOTE — ASSESSMENT & PLAN NOTE
- Left low back pain associated with movement and tenderness on palpation  - Most likely MSK related,   - However in view of patient's hx of left renal calculous and occasional dysuria, will go ahead with renal ultrasound to clarify if symptoms can be related to renal calculous     - Will order UA today to rule out UTI

## 2022-08-26 ENCOUNTER — APPOINTMENT (OUTPATIENT)
Dept: LAB | Facility: CLINIC | Age: 32
End: 2022-08-26
Payer: MEDICARE

## 2022-08-26 DIAGNOSIS — Z11.4 SCREENING FOR HIV (HUMAN IMMUNODEFICIENCY VIRUS): ICD-10-CM

## 2022-08-26 DIAGNOSIS — Z11.59 NEED FOR HEPATITIS C SCREENING TEST: ICD-10-CM

## 2022-08-26 LAB
ALBUMIN SERPL BCP-MCNC: 3.3 G/DL (ref 3.5–5)
ALP SERPL-CCNC: 78 U/L (ref 46–116)
ALT SERPL W P-5'-P-CCNC: 36 U/L (ref 12–78)
ANION GAP SERPL CALCULATED.3IONS-SCNC: 5 MMOL/L (ref 4–13)
AST SERPL W P-5'-P-CCNC: 20 U/L (ref 5–45)
BILIRUB SERPL-MCNC: 0.14 MG/DL (ref 0.2–1)
BUN SERPL-MCNC: 12 MG/DL (ref 5–25)
CALCIUM ALBUM COR SERPL-MCNC: 9.9 MG/DL (ref 8.3–10.1)
CALCIUM SERPL-MCNC: 9.3 MG/DL (ref 8.3–10.1)
CHLORIDE SERPL-SCNC: 108 MMOL/L (ref 96–108)
CHOLEST SERPL-MCNC: 136 MG/DL
CO2 SERPL-SCNC: 26 MMOL/L (ref 21–32)
CREAT SERPL-MCNC: 0.89 MG/DL (ref 0.6–1.3)
EST. AVERAGE GLUCOSE BLD GHB EST-MCNC: 126 MG/DL
GFR SERPL CREATININE-BSD FRML MDRD: 86 ML/MIN/1.73SQ M
GLUCOSE P FAST SERPL-MCNC: 84 MG/DL (ref 65–99)
HBA1C MFR BLD: 6 %
HCV AB SER QL: NORMAL
HDLC SERPL-MCNC: 40 MG/DL
LDLC SERPL CALC-MCNC: 79 MG/DL (ref 0–100)
POTASSIUM SERPL-SCNC: 3.9 MMOL/L (ref 3.5–5.3)
PROT SERPL-MCNC: 8 G/DL (ref 6.4–8.4)
SODIUM SERPL-SCNC: 139 MMOL/L (ref 135–147)
TRIGL SERPL-MCNC: 86 MG/DL

## 2022-08-26 PROCEDURE — 87389 HIV-1 AG W/HIV-1&-2 AB AG IA: CPT

## 2022-08-26 PROCEDURE — 80053 COMPREHEN METABOLIC PANEL: CPT

## 2022-08-26 PROCEDURE — 36415 COLL VENOUS BLD VENIPUNCTURE: CPT

## 2022-08-26 PROCEDURE — 80061 LIPID PANEL: CPT

## 2022-08-26 PROCEDURE — 86803 HEPATITIS C AB TEST: CPT

## 2022-08-26 PROCEDURE — 83036 HEMOGLOBIN GLYCOSYLATED A1C: CPT

## 2022-08-27 ENCOUNTER — APPOINTMENT (OUTPATIENT)
Dept: LAB | Facility: HOSPITAL | Age: 32
End: 2022-08-27
Payer: MEDICARE

## 2022-08-27 LAB
BACTERIA UR QL AUTO: ABNORMAL /HPF
BILIRUB UR QL STRIP: NEGATIVE
CLARITY UR: CLEAR
COLOR UR: YELLOW
GLUCOSE UR STRIP-MCNC: NEGATIVE MG/DL
HGB UR QL STRIP.AUTO: 150
HIV 1+2 AB+HIV1 P24 AG SERPL QL IA: NORMAL
KETONES UR STRIP-MCNC: NEGATIVE MG/DL
LEUKOCYTE ESTERASE UR QL STRIP: NEGATIVE
MUCOUS THREADS UR QL AUTO: ABNORMAL
NITRITE UR QL STRIP: NEGATIVE
NON-SQ EPI CELLS URNS QL MICRO: ABNORMAL /HPF
PH UR STRIP.AUTO: 6 [PH]
PROT UR STRIP-MCNC: NEGATIVE MG/DL
RBC #/AREA URNS AUTO: ABNORMAL /HPF
SP GR UR STRIP.AUTO: 1.02 (ref 1–1.04)
UROBILINOGEN UA: NEGATIVE MG/DL
WBC #/AREA URNS AUTO: ABNORMAL /HPF

## 2022-08-27 PROCEDURE — 81001 URINALYSIS AUTO W/SCOPE: CPT

## 2022-09-09 ENCOUNTER — HOSPITAL ENCOUNTER (OUTPATIENT)
Dept: ULTRASOUND IMAGING | Facility: HOSPITAL | Age: 32
End: 2022-09-09
Payer: MEDICARE

## 2022-09-09 DIAGNOSIS — N20.0 KIDNEY CALCULUS: ICD-10-CM

## 2022-09-09 PROCEDURE — 76770 US EXAM ABDO BACK WALL COMP: CPT

## 2022-09-12 ENCOUNTER — OFFICE VISIT (OUTPATIENT)
Dept: URGENT CARE | Age: 32
End: 2022-09-12
Payer: MEDICARE

## 2022-09-12 VITALS
HEART RATE: 96 BPM | SYSTOLIC BLOOD PRESSURE: 120 MMHG | RESPIRATION RATE: 20 BRPM | OXYGEN SATURATION: 99 % | TEMPERATURE: 98.3 F | BODY MASS INDEX: 30.29 KG/M2 | DIASTOLIC BLOOD PRESSURE: 78 MMHG | WEIGHT: 171 LBS

## 2022-09-12 DIAGNOSIS — U07.1 COVID: Primary | ICD-10-CM

## 2022-09-12 DIAGNOSIS — R50.9 FEVER, UNSPECIFIED: ICD-10-CM

## 2022-09-12 LAB
SARS-COV-2 AG UPPER RESP QL IA: POSITIVE
VALID CONTROL: ABNORMAL

## 2022-09-12 PROCEDURE — 99213 OFFICE O/P EST LOW 20 MIN: CPT

## 2022-09-12 PROCEDURE — 87811 SARS-COV-2 COVID19 W/OPTIC: CPT

## 2022-09-12 RX ORDER — PHENTERMINE HYDROCHLORIDE 37.5 MG/1
37.5 CAPSULE ORAL
COMMUNITY

## 2022-09-12 RX ORDER — METFORMIN HYDROCHLORIDE 500 MG/1
TABLET, EXTENDED RELEASE ORAL
COMMUNITY
Start: 2022-08-03

## 2022-09-12 RX ORDER — TOPIRAMATE 25 MG/1
TABLET ORAL
COMMUNITY
Start: 2022-07-06

## 2022-09-12 NOTE — LETTER
September 12, 2022     Patient: Jamar Chavez   YOB: 1990   Date of Visit: 9/12/2022       To Whom it May Concern:    Flower Sorto was seen in my clinic on 9/12/2022  She may return to work on 9/15/2022  If you have any questions or concerns, please don't hesitate to call           Sincerely,          ALISIA Early        CC: No Recipients

## 2022-09-13 NOTE — PROGRESS NOTES
[facility]        NAME: Felicia Yepez is a 33 y o  female  : 1990    MRN: [id number]  DATE: 2022  TIME: 9:24 AM    Assessment and Plan   COVID [U07 1]  1  COVID     2  Fever, unspecified  Poct Covid 19 Rapid Antigen Test       Patient presents with URI complaints of congestion, PND, body aches, fevers and sore throat for 2 days  No known exposure  Not vaccinated    Assessment notes congestion, PND, erythematous oropharynx and adenopathy  POCT COVID positive  Discussed symptom management  Patient Instructions       Follow up with PCP as needed    Chief Complaint     Chief Complaint   Patient presents with    Cold Like Symptoms     Pt started two days ago with a sore throat, headache, runny nose, fevers, body aches and back pain  No rapid Covid test performed  Pt is Covid vaccinated  Taking Nyquil  History of Present Illness       Patient presents with URI complaints of congestion, PND, body aches, fevers and sore throat for 2 days  No known exposure  Not vaccinated    Assessment notes congestion, PND, erythematous oropharynx and adenopathy  POCT COVID positive  Discussed symptom management  Review of Systems   Review of Systems   Constitutional: Positive for chills, fatigue and fever  HENT: Positive for congestion, postnasal drip and sore throat  Negative for ear discharge, ear pain, sinus pressure and sinus pain  Eyes: Negative for pain and discharge  Respiratory: Negative for cough and shortness of breath  Cardiovascular: Negative for chest pain and palpitations  Gastrointestinal: Negative for abdominal pain, diarrhea, nausea and vomiting  Genitourinary: Negative for difficulty urinating and dysuria  Musculoskeletal: Negative for arthralgias and myalgias  Skin: Negative for rash  Neurological: Negative for dizziness, syncope, light-headedness, numbness and headaches  Psychiatric/Behavioral: Negative for agitation     All other systems reviewed and are negative          Current Medications       Current Outpatient Medications:     cetirizine (ZyrTEC) 10 mg tablet, Take 1 tablet (10 mg total) by mouth daily, Disp: 30 tablet, Rfl: 3    fluticasone (FLONASE) 50 mcg/act nasal spray, 2 sprays into each nostril daily, Disp: 15 9 mL, Rfl: 2    loratadine (CLARITIN) 10 mg tablet, Take 1 tablet (10 mg total) by mouth daily, Disp: 90 tablet, Rfl: 0    metFORMIN (GLUCOPHAGE-XR) 500 mg 24 hr tablet, , Disp: , Rfl:     phentermine 37 5 MG capsule, Take 37 5 mg by mouth, Disp: , Rfl:     ibuprofen (MOTRIN) 600 mg tablet, Take 1 tablet (600 mg total) by mouth every 8 (eight) hours for 10 days, Disp: 30 tablet, Rfl: 0    Lidocaine-Menthol 4-1 % PTCH, Apply 1 each topically daily (Patient not taking: Reported on 9/12/2022), Disp: 30 patch, Rfl: 0    medroxyPROGESTERone (PROVERA) 10 mg tablet, Take 1 tablet (10 mg total) by mouth daily for 5 days, Disp: 5 tablet, Rfl: 4    pantoprazole (PROTONIX) 20 mg tablet, Take 1 tablet (20 mg total) by mouth daily before breakfast (Patient not taking: No sig reported), Disp: 90 tablet, Rfl: 0    pseudoephedrine (SUDAFED) 60 mg tablet, Take 1 tablet (60 mg total) by mouth every 4 (four) hours as needed for congestion for up to 5 days, Disp: 30 tablet, Rfl: 0    Sodium Fluoride 5000 Plus 1 1 % CREA, BRUSH TWICE DAILY AFTER BREAKFAST AND BEFORE BEDTIME DO NOT RINSE FOR 30 MINS, Disp: , Rfl:     sucralfate (CARAFATE) 1 g/10 mL suspension, Take 10 mL (1 g total) by mouth 4 (four) times a day (Patient not taking: No sig reported), Disp: 420 mL, Rfl: 0    topiramate (TOPAMAX) 25 mg tablet, , Disp: , Rfl:     Current Allergies     Allergies as of 09/12/2022 - Reviewed 09/12/2022   Allergen Reaction Noted    Iodinated diagnostic agents Other (See Comments) 04/28/2021    Iodine - food allergy Hives and Other (See Comments) 08/24/2017    Augmentin [amoxicillin-pot clavulanate]  12/18/2019    Shellfish allergy - food allergy Itching 08/17/2018    Shellfish-derived products - food allergy Itching 08/17/2018            The following portions of the patient's history were reviewed and updated as appropriate: allergies, current medications, past family history, past medical history, past social history, past surgical history and problem list      Past Medical History:   Diagnosis Date    Diarrhea 4/8/2019    Known health problems: none        Past Surgical History:   Procedure Laterality Date    CHOLECYSTECTOMY      TUBAL LIGATION Bilateral        Family History   Problem Relation Age of Onset    Ovarian cancer Mother     Uterine cancer Mother     No Known Problems Father     No Known Problems Sister     No Known Problems Brother     No Known Problems Daughter     No Known Problems Son          Medications have been verified  Objective   /78   Pulse 96   Temp 98 3 °F (36 8 °C)   Resp 20   Wt 77 6 kg (171 lb)   LMP 08/12/2022   SpO2 99%   BMI 30 29 kg/m²   Patient's last menstrual period was 08/12/2022  Physical Exam     Physical Exam  Vitals reviewed  Constitutional:       General: She is not in acute distress  Appearance: Normal appearance  She is not ill-appearing  HENT:      Head: Normocephalic and atraumatic  Right Ear: Tympanic membrane and ear canal normal  No middle ear effusion  Left Ear: Tympanic membrane and ear canal normal   No middle ear effusion  Mouth/Throat:      Mouth: Mucous membranes are moist       Pharynx: Posterior oropharyngeal erythema present  No oropharyngeal exudate  Tonsils: No tonsillar exudate  Eyes:      Extraocular Movements: Extraocular movements intact  Conjunctiva/sclera: Conjunctivae normal       Pupils: Pupils are equal, round, and reactive to light  Cardiovascular:      Rate and Rhythm: Normal rate and regular rhythm  Pulses: Normal pulses  Heart sounds: Normal heart sounds  No murmur heard    Pulmonary: Effort: Pulmonary effort is normal  No respiratory distress  Breath sounds: Normal breath sounds  No wheezing  Abdominal:      General: Bowel sounds are normal  There is no distension  Palpations: Abdomen is soft  Tenderness: There is no abdominal tenderness  There is no guarding  Musculoskeletal:      Cervical back: Normal range of motion and neck supple  No tenderness  Lymphadenopathy:      Cervical: Cervical adenopathy present  Skin:     General: Skin is warm  Neurological:      General: No focal deficit present  Mental Status: She is alert     Psychiatric:         Mood and Affect: Mood normal          Behavior: Behavior normal          Judgment: Judgment normal

## 2022-10-05 ENCOUNTER — OFFICE VISIT (OUTPATIENT)
Dept: URGENT CARE | Facility: CLINIC | Age: 32
End: 2022-10-05
Payer: MEDICARE

## 2022-10-05 VITALS
DIASTOLIC BLOOD PRESSURE: 84 MMHG | SYSTOLIC BLOOD PRESSURE: 127 MMHG | TEMPERATURE: 97.5 F | RESPIRATION RATE: 16 BRPM | HEART RATE: 85 BPM | OXYGEN SATURATION: 99 %

## 2022-10-05 DIAGNOSIS — J01.90 ACUTE NON-RECURRENT SINUSITIS, UNSPECIFIED LOCATION: Primary | ICD-10-CM

## 2022-10-05 PROCEDURE — 99213 OFFICE O/P EST LOW 20 MIN: CPT | Performed by: PHYSICIAN ASSISTANT

## 2022-10-05 RX ORDER — AZITHROMYCIN 250 MG/1
TABLET, FILM COATED ORAL
Qty: 6 TABLET | Refills: 0 | Status: SHIPPED | OUTPATIENT
Start: 2022-10-05 | End: 2022-10-09

## 2022-10-05 NOTE — PATIENT INSTRUCTIONS
Patient was educated on sinus infection  Patient was placed on antibiotics  Patient was told to eat on antibiotics  If symptoms persist follow up with PCP  Patient was educated ears look clear  Sinusitis   WHAT YOU NEED TO KNOW:   Sinusitis is inflammation or infection of your sinuses  Sinusitis is most often caused by a virus  Acute sinusitis may last up to 12 weeks  Chronic sinusitis lasts longer than 12 weeks  Recurrent sinusitis means you have 4 or more infections in 1 year  DISCHARGE INSTRUCTIONS:   Return to the emergency department if:   You have trouble breathing or wheezing that is getting worse  You have a stiff neck, a fever, or a bad headache  You cannot open your eye  Your eyeball bulges out or you cannot move your eye  You are more sleepy than normal, or you notice changes in your ability to think, move, or talk  You have swelling of your forehead or scalp  Call your doctor if:   You have vision changes, such as double vision  Your eye and eyelid are red, swollen, and painful  Your symptoms do not improve or go away after 10 days  You have nausea and are vomiting  Your nose is bleeding  You have questions or concerns about your condition or care  Medicines: Your symptoms may go away on their own  Your healthcare provider may recommend watchful waiting for up to 10 days before starting antibiotics  You may need any of the following:  Acetaminophen  decreases pain and fever  It is available without a doctor's order  Ask how much to take and how often to take it  Follow directions  Read the labels of all other medicines you are using to see if they also contain acetaminophen, or ask your doctor or pharmacist  Acetaminophen can cause liver damage if not taken correctly  Do not use more than 4 grams (4,000 milligrams) total of acetaminophen in one day  NSAIDs , such as ibuprofen, help decrease swelling, pain, and fever   This medicine is available with or without a doctor's order  NSAIDs can cause stomach bleeding or kidney problems in certain people  If you take blood thinner medicine, always ask your healthcare provider if NSAIDs are safe for you  Always read the medicine label and follow directions  Nasal steroid sprays  may help decrease inflammation in your nose and sinuses  Decongestants  help reduce swelling and drain mucus in the nose and sinuses  They may help you breathe easier  Antihistamines  help dry mucus in the nose and relieve sneezing  Antibiotics  help treat or prevent a bacterial infection  Take your medicine as directed  Contact your healthcare provider if you think your medicine is not helping or if you have side effects  Tell him or her if you are allergic to any medicine  Keep a list of the medicines, vitamins, and herbs you take  Include the amounts, and when and why you take them  Bring the list or the pill bottles to follow-up visits  Carry your medicine list with you in case of an emergency  Self-care:   Rinse your sinuses as directed  Use a sinus rinse device to rinse your nasal passages with a saline (salt water) solution or distilled water  Do not use tap water  This will help thin the mucus in your nose and rinse away pollen and dirt  It will also help reduce swelling so you can breathe normally  Use a humidifier  to increase air moisture in your home  This may make it easier for you to breathe and help decrease your cough  Sleep with your head elevated  Place an extra pillow under your head before you go to sleep to help your sinuses drain  Drink liquids as directed  Ask your healthcare provider how much liquid to drink each day and which liquids are best for you  Liquids will thin the mucus in your nose and help it drain  Avoid drinks that contain alcohol or caffeine  Do not smoke, and avoid secondhand smoke    Nicotine and other chemicals in cigarettes and cigars can make your symptoms worse  Ask your healthcare provider for information if you currently smoke and need help to quit  E-cigarettes or smokeless tobacco still contain nicotine  Talk to your healthcare provider before you use these products  Prevent the spread of germs:   Wash your hands often with soap and water  Wash your hands after you use the bathroom, change a child's diaper, or sneeze  Wash your hands before you prepare or eat food  Stay away from people who are sick  Some germs spread easily and quickly through contact  Follow up with your doctor as directed: You may be referred to an ear, nose, and throat specialist  Write down your questions so you remember to ask them during your visits  © Copyright [employer] 2022 Information is for End User's use only and may not be sold, redistributed or otherwise used for commercial purposes  All illustrations and images included in CareNotes® are the copyrighted property of [employer] Inc  or [employer]gr[employer]   The above information is an  only  It is not intended as medical advice for individual conditions or treatments  Talk to your doctor, nurse or pharmacist before following any medical regimen to see if it is safe and effective for you

## 2022-10-05 NOTE — PROGRESS NOTES
[facility]        NAME: Mortimer Scurry is a 33 y o  female  : 1990    MRN: [id number]  DATE: 2022  TIME: 12:25 PM    Assessment and Plan   Acute non-recurrent sinusitis, unspecified location [J01 90]  1  Acute non-recurrent sinusitis, unspecified location  azithromycin (ZITHROMAX) 250 mg tablet     Declined COVID 19 testing    Patient Instructions   Patient was educated on sinus infection  Patient was placed on antibiotics  Patient was told to eat on antibiotics  If symptoms persist follow up with PCP  Patient was educated ears look clear  Chief Complaint     Chief Complaint   Patient presents with    Earache     Yesterday:  L>R bilateral ear pain  Denies fever, chills, n/v/d, body aches  History of Present Illness       Patient is here today complaining of B/L ear pain and pressure for 1 day  Patient reports no chances of COVID 19  Patient is allergic to Augmentin  Patient speaks Setswana and was offered a  but declined  Denies any chest pain or headaches  Review of Systems   Review of Systems   Constitutional: Negative  HENT: Positive for congestion and ear pain  Respiratory: Negative  Cardiovascular: Negative  Neurological: Negative for headaches  Psychiatric/Behavioral: Negative            Current Medications       Current Outpatient Medications:     azithromycin (ZITHROMAX) 250 mg tablet, Take 2 tablets today then 1 tablet daily x 4 days, Disp: 6 tablet, Rfl: 0    cetirizine (ZyrTEC) 10 mg tablet, Take 1 tablet (10 mg total) by mouth daily, Disp: 30 tablet, Rfl: 3    fluticasone (FLONASE) 50 mcg/act nasal spray, 2 sprays into each nostril daily, Disp: 15 9 mL, Rfl: 2    ibuprofen (MOTRIN) 600 mg tablet, Take 1 tablet (600 mg total) by mouth every 8 (eight) hours for 10 days, Disp: 30 tablet, Rfl: 0    Lidocaine-Menthol 4-1 % PTCH, Apply 1 each topically daily (Patient not taking: Reported on 2022), Disp: 30 patch, Rfl: 0   loratadine (CLARITIN) 10 mg tablet, Take 1 tablet (10 mg total) by mouth daily, Disp: 90 tablet, Rfl: 0    medroxyPROGESTERone (PROVERA) 10 mg tablet, Take 1 tablet (10 mg total) by mouth daily for 5 days, Disp: 5 tablet, Rfl: 4    metFORMIN (GLUCOPHAGE-XR) 500 mg 24 hr tablet, , Disp: , Rfl:     pantoprazole (PROTONIX) 20 mg tablet, Take 1 tablet (20 mg total) by mouth daily before breakfast (Patient not taking: No sig reported), Disp: 90 tablet, Rfl: 0    phentermine 37 5 MG capsule, Take 37 5 mg by mouth, Disp: , Rfl:     pseudoephedrine (SUDAFED) 60 mg tablet, Take 1 tablet (60 mg total) by mouth every 4 (four) hours as needed for congestion for up to 5 days, Disp: 30 tablet, Rfl: 0    Sodium Fluoride 5000 Plus 1 1 % CREA, BRUSH TWICE DAILY AFTER BREAKFAST AND BEFORE BEDTIME DO NOT RINSE FOR 30 MINS, Disp: , Rfl:     sucralfate (CARAFATE) 1 g/10 mL suspension, Take 10 mL (1 g total) by mouth 4 (four) times a day (Patient not taking: No sig reported), Disp: 420 mL, Rfl: 0    topiramate (TOPAMAX) 25 mg tablet, , Disp: , Rfl:     Current Allergies     Allergies as of 10/05/2022 - Reviewed 10/05/2022   Allergen Reaction Noted    Iodinated diagnostic agents Other (See Comments) 04/28/2021    Iodine - food allergy Hives and Other (See Comments) 08/24/2017    Augmentin [amoxicillin-pot clavulanate]  12/18/2019    Shellfish allergy - food allergy Itching 08/17/2018    Shellfish-derived products - food allergy Itching 08/17/2018            The following portions of the patient's history were reviewed and updated as appropriate: allergies, current medications, past family history, past medical history, past social history, past surgical history and problem list      Past Medical History:   Diagnosis Date    Diarrhea 4/8/2019    Known health problems: none        Past Surgical History:   Procedure Laterality Date    CHOLECYSTECTOMY      TUBAL LIGATION Bilateral        Family History   Problem Relation Age of Onset    Ovarian cancer Mother     Uterine cancer Mother     No Known Problems Father     No Known Problems Sister     No Known Problems Brother     No Known Problems Daughter     No Known Problems Son          Medications have been verified  Objective   /84   Pulse 85   Temp 97 5 °F (36 4 °C)   Resp 16   SpO2 99%   No LMP recorded  Physical Exam     Physical Exam  Vitals and nursing note reviewed  Constitutional:       Appearance: Normal appearance  HENT:      Head: Normocephalic  Comments: Pain over frontal or maxillary sinus     Right Ear: Tympanic membrane, ear canal and external ear normal       Left Ear: Tympanic membrane, ear canal and external ear normal       Nose: Nose normal       Mouth/Throat:      Mouth: Mucous membranes are moist       Pharynx: No oropharyngeal exudate or posterior oropharyngeal erythema  Eyes:      Extraocular Movements: Extraocular movements intact  Pupils: Pupils are equal, round, and reactive to light  Neck:      Comments: NO palpable lymph nodes  Cardiovascular:      Rate and Rhythm: Normal rate and regular rhythm  Heart sounds: Normal heart sounds  Pulmonary:      Breath sounds: Normal breath sounds  No wheezing  Neurological:      General: No focal deficit present  Mental Status: She is alert and oriented to person, place, and time     Psychiatric:         Mood and Affect: Mood normal          Behavior: Behavior normal

## 2022-10-27 ENCOUNTER — TELEPHONE (OUTPATIENT)
Dept: FAMILY MEDICINE CLINIC | Facility: CLINIC | Age: 32
End: 2022-10-27

## 2022-10-27 NOTE — TELEPHONE ENCOUNTER
I have attempted to contact this patient by phone with the following results: left message to return my call on answering machine  Patient sent a my chart request for an appointment for congestion and forehead pain  Called to offer same day appointment

## 2022-10-31 ENCOUNTER — OFFICE VISIT (OUTPATIENT)
Dept: FAMILY MEDICINE CLINIC | Facility: CLINIC | Age: 32
End: 2022-10-31

## 2022-10-31 VITALS
HEART RATE: 81 BPM | RESPIRATION RATE: 18 BRPM | HEIGHT: 63 IN | SYSTOLIC BLOOD PRESSURE: 124 MMHG | DIASTOLIC BLOOD PRESSURE: 84 MMHG | WEIGHT: 173.6 LBS | TEMPERATURE: 97.2 F | OXYGEN SATURATION: 99 % | BODY MASS INDEX: 30.76 KG/M2

## 2022-10-31 DIAGNOSIS — J01.01 ACUTE RECURRENT MAXILLARY SINUSITIS: Primary | ICD-10-CM

## 2022-10-31 RX ORDER — KETOTIFEN FUMARATE 0.35 MG/ML
1 SOLUTION/ DROPS OPHTHALMIC 2 TIMES DAILY
Qty: 5 ML | Refills: 0 | Status: SHIPPED | OUTPATIENT
Start: 2022-10-31

## 2022-10-31 RX ORDER — IPRATROPIUM BROMIDE 21 UG/1
2 SPRAY, METERED NASAL DAILY
Qty: 30 ML | Refills: 1 | Status: SHIPPED | OUTPATIENT
Start: 2022-10-31

## 2022-10-31 NOTE — PROGRESS NOTES
Assessment/Plan:    Acute recurrent maxillary sinusitis  Patient with at least 4 episodes of sinusitis in the past 6 months, several of which were treated with antibiotics  Patient reports associated itchy eyes  - Ambulatory referral to ENT  - Ketotifen eye drops sent to pharmacy  - Ipratropium nasal spray to be used in addition to flonase  - Will consider antibiotics if symptoms persist         No follow-ups on file  Diagnoses and all orders for this visit:    Acute recurrent maxillary sinusitis  -     Ambulatory Referral to Otolaryngology; Future  -     ipratropium (ATROVENT) 0 03 % nasal spray; 2 sprays into each nostril in the morning  -     ketotifen (ZADITOR) 0 025 % ophthalmic solution; Administer 1 drop to both eyes 2 (two) times a day          Subjective:     Isaac Kilpatrick is a 33 y o  female who  has a past medical history of who presented to the office today for congestion  HPI    Patient reports pressure in her nasal sinuses and pressure in her maxillary sinuses  She also reports associated lightheadedness and fatigue  Denies any sick contacts  Symptoms have been ongoing for 2 past months however have been worse over the past several days  Reports associated nausea  Patient denies any environmental allergies  Denies fever or chills, vomiting, or diarrhea  Review of Systems   Constitutional: Negative for chills and fever  HENT: Negative for congestion, ear pain and rhinorrhea  Eyes: Negative for visual disturbance  Respiratory: Negative for chest tightness, shortness of breath and wheezing  Cardiovascular: Negative for chest pain and palpitations  Gastrointestinal: Negative for abdominal pain, constipation, diarrhea and vomiting  Endocrine: Negative for polyuria  Genitourinary: Negative for dysuria  Musculoskeletal: Negative for arthralgias and myalgias  Neurological: Negative for dizziness, syncope and light-headedness     Psychiatric/Behavioral: Negative for hallucinations, self-injury and suicidal ideas  Objective:    /84 (BP Location: Left arm, Patient Position: Sitting, Cuff Size: Standard)   Pulse 81   Temp (!) 97 2 °F (36 2 °C) (Temporal)   Resp 18   Ht 5' 3" (1 6 m)   Wt 78 7 kg (173 lb 9 6 oz)   SpO2 99%   BMI 30 75 kg/m²     Physical Exam  Constitutional:       Appearance: Normal appearance  HENT:      Head: Normocephalic and atraumatic  Comments: Tenderness on palpation of maxillary sinuses, bilaterally      Nose: Nose normal    Eyes:      Conjunctiva/sclera: Conjunctivae normal    Cardiovascular:      Rate and Rhythm: Normal rate and regular rhythm  Heart sounds: Normal heart sounds  Pulmonary:      Effort: Pulmonary effort is normal       Breath sounds: Normal breath sounds  Musculoskeletal:         General: Normal range of motion  Cervical back: Normal range of motion  Skin:     General: Skin is warm and dry  Neurological:      Mental Status: She is alert and oriented to person, place, and time     Psychiatric:         Behavior: Behavior normal          Holden Manzano  11/01/22  5:18 PM

## 2022-11-01 PROBLEM — J01.01 ACUTE RECURRENT MAXILLARY SINUSITIS: Status: ACTIVE | Noted: 2022-11-01

## 2022-11-01 NOTE — ASSESSMENT & PLAN NOTE
Patient with at least 4 episodes of sinusitis in the past 6 months, several of which were treated with antibiotics  Patient reports associated itchy eyes      - Ambulatory referral to ENT  - Ketotifen eye drops sent to pharmacy  - Ipratropium nasal spray to be used in addition to flonase  - Will consider antibiotics if symptoms persist

## 2022-11-23 DIAGNOSIS — J01.01 ACUTE RECURRENT MAXILLARY SINUSITIS: ICD-10-CM

## 2022-11-23 RX ORDER — KETOTIFEN FUMARATE 0.35 MG/ML
SOLUTION/ DROPS OPHTHALMIC
Qty: 5 ML | Refills: 0 | Status: SHIPPED | OUTPATIENT
Start: 2022-11-23

## 2022-11-28 DIAGNOSIS — J30.89 NON-SEASONAL ALLERGIC RHINITIS, UNSPECIFIED TRIGGER: ICD-10-CM

## 2022-11-28 RX ORDER — FLUTICASONE PROPIONATE 50 MCG
SPRAY, SUSPENSION (ML) NASAL
Qty: 16 G | Refills: 1 | Status: SHIPPED | OUTPATIENT
Start: 2022-11-28

## 2022-12-31 PROBLEM — J01.01 ACUTE RECURRENT MAXILLARY SINUSITIS: Status: RESOLVED | Noted: 2022-11-01 | Resolved: 2022-12-31

## 2023-01-25 ENCOUNTER — CONSULT (OUTPATIENT)
Dept: MULTI SPECIALTY CLINIC | Facility: CLINIC | Age: 33
End: 2023-01-25

## 2023-01-25 DIAGNOSIS — L64.9 ANDROGENETIC ALOPECIA: Primary | ICD-10-CM

## 2023-01-25 DIAGNOSIS — L65.9 HAIR LOSS: ICD-10-CM

## 2023-01-25 RX ORDER — MINOXIDIL 2.5 MG/1
TABLET ORAL
Qty: 45 TABLET | Refills: 0 | Status: SHIPPED | OUTPATIENT
Start: 2023-01-25

## 2023-01-25 NOTE — PROGRESS NOTES
[facility] Note     Patient Name: Raghav Jewell  Encounter Date: 01/25/2023     Have you been cared for by a [facility] in the last 3 years and, if so, which description applies to you? NO  I am considered a "new" patient and must complete all patient intake questions  I am FEMALE/of child-bearing potential     REVIEW OF SYSTEMS:  Have you recently had or currently have any of the following? · Recent fever or chills? No  · Any non-healing wound? No  · Are you pregnant or planning to become pregnant? No  · Are you currently or planning to be nursing or breast feeding? No   PAST MEDICAL HISTORY:  Have you personally ever had or currently have any of the following? If "YES," then please provide more detail  · Skin cancer (such as Melanoma, Basal Cell Carcinoma, Squamous Cell Carcinoma? No  · Tuberculosis, HIV/AIDS, Hepatitis B or C: No  · Systemic Immunosuppression such as Diabetes, Biologic or Immunotherapy, Chemotherapy, Organ Transplantation, Bone Marrow Transplantation No  · Radiation Treatment No   FAMILY HISTORY:  Any "first degree relatives" (parent, brother, sister, or child) with the following? • Skin Cancer, Pancreatic or Other Cancer? No   PATIENT EXPERIENCE:    • Do you want the Dermatologist to perform a COMPLETE skin exam today including a clinical examination under the "bra and underwear" areas? NO  • If necessary, do we have your permission to call and leave a detailed message on your Preferred Phone number that includes your specific medical information?   Yes      Allergies   Allergen Reactions   • Iodinated Contrast Media Other (See Comments)     Pt states she had ivc in [place] [employer] as a child and was allergic  Doesn't remember what the reaction was but doctor told her not to have the dye   • Iodine - Food Allergy Hives and Other (See Comments)     Pt states she had ivc in [place] [employer] as a child and was allergic  Doesn't remember what the reaction was but doctor told her not to have the dye   • Augmentin [Amoxicillin-Pot Clavulanate]      Epistaxis       • Shellfish Allergy - Food Allergy Itching   • Shellfish-Derived Products - Food Allergy Itching      Current Outpatient Medications:   •  cetirizine (ZyrTEC) 10 mg tablet, Take 1 tablet (10 mg total) by mouth daily (Patient not taking: Reported on 1/25/2023), Disp: 30 tablet, Rfl: 3  •  fluticasone (FLONASE) 50 mcg/act nasal spray, SHAKE LIQUID AND USE 2 SPRAYS IN EACH NOSTRIL DAILY (Patient not taking: Reported on 1/25/2023), Disp: 16 g, Rfl: 1  •  ibuprofen (MOTRIN) 600 mg tablet, Take 1 tablet (600 mg total) by mouth every 8 (eight) hours for 10 days, Disp: 30 tablet, Rfl: 0  •  ipratropium (ATROVENT) 0 03 % nasal spray, 2 sprays into each nostril in the morning (Patient not taking: Reported on 1/25/2023), Disp: 30 mL, Rfl: 1  •  ketotifen (ZADITOR) 0 025 % ophthalmic solution, INSTILL 1 DROP IN BOTH EYES TWICE DAILY (Patient not taking: Reported on 1/25/2023), Disp: 5 mL, Rfl: 0  •  Lidocaine-Menthol 4-1 % PTCH, Apply 1 each topically daily (Patient not taking: Reported on 9/12/2022), Disp: 30 patch, Rfl: 0  •  loratadine (CLARITIN) 10 mg tablet, Take 1 tablet (10 mg total) by mouth daily (Patient not taking: Reported on 1/25/2023), Disp: 90 tablet, Rfl: 0  •  medroxyPROGESTERone (PROVERA) 10 mg tablet, Take 1 tablet (10 mg total) by mouth daily for 5 days, Disp: 5 tablet, Rfl: 4  •  metFORMIN (GLUCOPHAGE-XR) 500 mg 24 hr tablet, , Disp: , Rfl:   •  pantoprazole (PROTONIX) 20 mg tablet, Take 1 tablet (20 mg total) by mouth daily before breakfast (Patient not taking: No sig reported), Disp: 90 tablet, Rfl: 0  •  phentermine 37 5 MG capsule, Take 37 5 mg by mouth, Disp: , Rfl:   •  pseudoephedrine (SUDAFED) 60 mg tablet, Take 1 tablet (60 mg total) by mouth every 4 (four) hours as needed for congestion for up to 5 days, Disp: 30 tablet, Rfl: 0  •  Sodium Fluoride 5000 Plus 1 1 % CREA, BRUSH TWICE DAILY AFTER BREAKFAST AND BEFORE BEDTIME DO NOT RINSE FOR 30 MINS, Disp: , Rfl:   •  sucralfate (CARAFATE) 1 g/10 mL suspension, Take 10 mL (1 g total) by mouth 4 (four) times a day (Patient not taking: No sig reported), Disp: 420 mL, Rfl: 0  •  topiramate (TOPAMAX) 25 mg tablet, , Disp: , Rfl:           • Whom besides the patient is providing clinical information about today's encounter?   o NO ADDITIONAL HISTORIAN (patient alone provided history)-DCA translating in Armenian    Physical Exam and Assessment/Plan by Diagnosis:    ANDROGENIC ALOPECIA    Physical Exam:  • Anatomic Location Affected:  Scalp  • Morphological Description:  Diffuse thinning; miniaturization of hair follicles visualized on dermoscopy    Additional History of Present Condition:  Patient states in 2018 she began having decreased periods  At the same time, her hair started following out  She had her thyroid checked and a transvaginal US that showed adnexal cysts and adenomyosis  She was given provera to help her have a period but it did not work  Over the last year she has had very few periods and since September, her hair loss has significantly sped up  Assessment and Plan:  Based on a thorough discussion of this condition and the management approach to it (including a comprehensive discussion of the known risks, side effects and potential benefits of treatment), the patient (family) agrees to implement the following specific plan:    • Discussed that there is concern that her ovaries may not be functioning properly which is causing a hormonal hair loss that is making her hair fall out  • Start taking 1 25 mg oral minoxidil once daily with a meal and a glass of water  Discussed risks and side effects   Discussed that it can cause lowering of blood pressure, make her heart race (denies any personal history of heart problems), can cause swelling in legs, and can cause hair regrowth in other areas; discussed that it takes several months for hair regrowth  • Would also start OTC minoxidil 5% topically once daily to scalp  • Referral to endocrinology placed ASAP  • Return to clinic in 3 months for AGA follow-up        What is androgenic alopecia? Androgenic alopecia is a common form of hair loss in both men and women  This form of hair loss affects an estimated 50 million men and 30 million women in the [place]  Androgenetic alopecia can start as early as a person's teens and risk increases with age; more than 48 percent of men over age 48 have some degree of hair loss  In women, hair loss is most likely after menopause  There are both genetic and environmental factors that contribute to androgenic alopecia  Many of these factors remain unknown  Researchers have determined that this form of hair loss is related to hormones called androgens  - Dihydrotestoerone is particularly implicated   - Increased androgen levels in hair follicles lead to shorter hair growth cycles and thinner strands of hair  There is also a delay in growth of new hair to replace shed strands  - Suspected gene involvement includes the AR gene that makes a protein called androgen receptor  The androgen receptors allow the body to repond to dihydrotestoerone and other androgens  - The vast majority of women affected by female pattern hair loss do not have underlying hormonal abnormalities, so other causes such as thyroid function must also be explored  The inheritance pattern of androgenic alopecia is unclear, but the condition tends to cluster in families  Having a close relative with patterned hair loss seems to be a risk factor for development of the condition       Androgenic alopecia in men has been associated with several other medical conditions including:   - Coronary heart disease and enlargement of the prostate    - Prostate cancer, disorders of insulin resistance (such as diabetes and obesity), and high blood pressure (hypertension) have been linked to androgenic alopecia    - In women, this form of hair loss is associated with an increased risk of  (PCOS)  PCOS is characterized by a hormonal imbalance that can lead to irregular menstruation, acne, excess hair elsewhere on the body (hirsutism), and weight gain  What are the symptoms of androgenic alopecia in men and women? In men, this condition is also known as male-pattern baldness  Hair is lost in a typical pattern, beginning above both temples  Over time, the hairline recedes to form a characteristic "M" shape  Hair also thins at the crown (near the top of the head), often progressing to partial or complete baldness  The pattern of hair loss in women differs from male-pattern baldness  In women, the hair becomes thinner all over the head, and the hairline does not recede  Thinning hair occurs on the mid-frontal area of the scalp and is generally less severe than occurs in males, rarely resulting in total baldness  How do we diagnose androgenic alopecia? A careful history often suggests the underlying cause of alopecia  Crucial factors include the duration and pattern of hair loss, whether the hair is broken or shed at the roots, and whether shedding or thinning has increased  The patient's diet, medications, present and past medical conditions, and family history of alopecia are other important factors  The “pull test” is an easy technique for assessing hair loss  Approximately 60 hairs are grasped between the thumb and the index and middle fingers  The hairs are then gently but firmly pulled  A negative test (six or fewer hairs obtained) indicates normal shedding, whereas a positive test (more than six hairs obtained) indicates a process of active hair shedding  Patients should not shampoo their hair 24 hours before the test is performed       If the diagnosis is not clear based on the history and physical examination, selected laboratory tests and, occasionally, punch biopsy may be indicated  Common lab tests include testosterone levels, thyroid function tests, VDRL to rule out syphilis, ferritin levels for iron deficiency, and ELIAS test for lupus  How do we treat androgenic alopecia? The main goal of treatment is to slow or stop the progression of hair loss rather than promote hair regrowth  Results are variable and it is not possible to predict who may benefit from treatment  Options for treatment include:   o 2% minoxidil solution  This medication is applied with a dropper onto dry scale twice daily  Hands should be washed thoroughly to avoid inadvertent application to other parts of the body  It is a pregnancy category C drug and is not recommended for children younger than 25years of age    o Side effects include excessive hair growth, especially above the eyebrows and over the cheeks  It usually disappears after a year even with continued use of minoxidil  - Tretinoin (Retin-A) applied to the scalp as an adjunct to minoxidil has shown some benefits   - Women  o Estrogen therapy such as birth control pills that contain the least amount of progestin (Ortho-Cyclen, Ortho Tri-Cyclen, Ovcon 35, Mircette, Demulen, Zovia)  o Spironolactone (Aldactone) which is a weak inhibitor of androgen binding to androgen receptors  This is an off-label use of the drug   - Men  o Finasteride (Proscar) which inhibits the production of dihydrotestosterone  Other options to improve cosmetic appearance can include wearing wigs and hair transplantation  The patient was seen and discussed with Dr Penny Drake       RTC: 3 months for AGA hair loss follow-up    Ángel Haney  Dermatology PGY-4 Resident Physician

## 2023-01-25 NOTE — PATIENT INSTRUCTIONS
ANDROGENIC ALOPECIA      Assessment and Plan:  Based on a thorough discussion of this condition and the management approach to it (including a comprehensive discussion of the known risks, side effects and potential benefits of treatment), the patient (family) agrees to implement the following specific plan:    Start taking 1 25 mg oral minoxidil once daily with a meal and a glass of water  Discussed risks and side effects  Discussed that it can cause lowering of blood pressure, make her heart race (denies any personal history of heart problems), can cause swelling in legs, and can cause hair regrowth in other areas; discussed that it takes several months for hair regrowth  Would also start OTC minoxidil topically once daily to scalp  Referral to endocrinology placed  Return to clinic in 3 months for AGA follow-up    What is androgenic alopecia? Androgenic alopecia is a common form of hair loss in both men and women  This form of hair loss affects an estimated 50 million men and 30 million women in the [place]  Androgenetic alopecia can start as early as a person's teens and risk increases with age; more than 48 percent of men over age 48 have some degree of hair loss  In women, hair loss is most likely after menopause  There are both genetic and environmental factors that contribute to androgenic alopecia  Many of these factors remain unknown  Researchers have determined that this form of hair loss is related to hormones called androgens  Dihydrotestoerone is particularly implicated   Increased androgen levels in hair follicles lead to shorter hair growth cycles and thinner strands of hair  There is also a delay in growth of new hair to replace shed strands  Suspected gene involvement includes the AR gene that makes a protein called androgen receptor  The androgen receptors allow the body to repond to dihydrotestoerone and other androgens     The vast majority of women affected by female pattern hair loss do not have underlying hormonal abnormalities, so other causes such as thyroid function must also be explored  The inheritance pattern of androgenic alopecia is unclear, but the condition tends to cluster in families  Having a close relative with patterned hair loss seems to be a risk factor for development of the condition  Androgenic alopecia in men has been associated with several other medical conditions including:   Coronary heart disease and enlargement of the prostate  Prostate cancer, disorders of insulin resistance (such as diabetes and obesity), and high blood pressure (hypertension) have been linked to androgenic alopecia  In women, this form of hair loss is associated with an increased risk of  (PCOS)  PCOS is characterized by a hormonal imbalance that can lead to irregular menstruation, acne, excess hair elsewhere on the body (hirsutism), and weight gain  What are the symptoms of androgenic alopecia in men and women? In men, this condition is also known as male-pattern baldness  Hair is lost in a typical pattern, beginning above both temples  Over time, the hairline recedes to form a characteristic "M" shape  Hair also thins at the crown (near the top of the head), often progressing to partial or complete baldness  The pattern of hair loss in women differs from male-pattern baldness  In women, the hair becomes thinner all over the head, and the hairline does not recede  Thinning hair occurs on the mid-frontal area of the scalp and is generally less severe than occurs in males, rarely resulting in total baldness  How do we diagnose androgenic alopecia? A careful history often suggests the underlying cause of alopecia  Crucial factors include the duration and pattern of hair loss, whether the hair is broken or shed at the roots, and whether shedding or thinning has increased   The patient's diet, medications, present and past medical conditions, and family history of alopecia are other important factors  The “pull test” is an easy technique for assessing hair loss  Approximately 60 hairs are grasped between the thumb and the index and middle fingers  The hairs are then gently but firmly pulled  A negative test (six or fewer hairs obtained) indicates normal shedding, whereas a positive test (more than six hairs obtained) indicates a process of active hair shedding  Patients should not shampoo their hair 24 hours before the test is performed  If the diagnosis is not clear based on the history and physical examination, selected laboratory tests and, occasionally, punch biopsy may be indicated  Common lab tests include testosterone levels, thyroid function tests, VDRL to rule out syphilis, ferritin levels for iron deficiency, and ELIAS test for lupus  How do we treat androgenic alopecia? The main goal of treatment is to slow or stop the progression of hair loss rather than promote hair regrowth  Results are variable and it is not possible to predict who may benefit from treatment  Options for treatment include:   2% minoxidil solution  This medication is applied with a dropper onto dry scale twice daily  Hands should be washed thoroughly to avoid inadvertent application to other parts of the body  It is a pregnancy category C drug and is not recommended for children younger than 25years of age  Side effects include excessive hair growth, especially above the eyebrows and over the cheeks  It usually disappears after a year even with continued use of minoxidil  Tretinoin (Retin-A) applied to the scalp as an adjunct to minoxidil has shown some benefits  Women  Estrogen therapy such as birth control pills that contain the least amount of progestin (Ortho-Cyclen, Ortho Tri-Cyclen, Ovcon 35, Mircette, Demulen, Zovia)  Spironolactone (Aldactone) which is a weak inhibitor of androgen binding to androgen receptors  This is an off-label use of the drug    Men  Finasteride (Proscar) which inhibits the production of dihydrotestosterone  Other options to improve cosmetic appearance can include wearing wigs and hair transplantation

## 2023-05-19 ENCOUNTER — TELEPHONE (OUTPATIENT)
Dept: FAMILY MEDICINE CLINIC | Facility: CLINIC | Age: 33
End: 2023-05-19

## 2023-05-19 NOTE — TELEPHONE ENCOUNTER
PCP SIGNATURE NEEDED FOR Surgical Clearance Request FORM RECEIVED VIA FAX AND PLACED IN PCP FOLDER TO BE DELIVERED AT ASSIGNED TIMES      ENT Surgery

## 2023-05-31 ENCOUNTER — CONSULT (OUTPATIENT)
Dept: FAMILY MEDICINE CLINIC | Facility: CLINIC | Age: 33
End: 2023-05-31

## 2023-05-31 ENCOUNTER — APPOINTMENT (OUTPATIENT)
Dept: LAB | Facility: HOSPITAL | Age: 33
End: 2023-05-31
Payer: MEDICARE

## 2023-05-31 VITALS
BODY MASS INDEX: 30.48 KG/M2 | OXYGEN SATURATION: 97 % | TEMPERATURE: 97.7 F | HEIGHT: 63 IN | DIASTOLIC BLOOD PRESSURE: 70 MMHG | SYSTOLIC BLOOD PRESSURE: 108 MMHG | RESPIRATION RATE: 16 BRPM | WEIGHT: 172 LBS | HEART RATE: 88 BPM

## 2023-05-31 DIAGNOSIS — Z01.818 OTHER SPECIFIED PRE-OPERATIVE EXAMINATION: ICD-10-CM

## 2023-05-31 DIAGNOSIS — Z01.818 PRE-OP EVALUATION: Primary | ICD-10-CM

## 2023-05-31 PROBLEM — M54.40 ACUTE BILATERAL LOW BACK PAIN WITH SCIATICA: Status: RESOLVED | Noted: 2018-08-17 | Resolved: 2023-05-31

## 2023-05-31 PROBLEM — M54.50 LEFT LOW BACK PAIN: Status: RESOLVED | Noted: 2022-08-25 | Resolved: 2023-05-31

## 2023-05-31 PROBLEM — M54.6 ACUTE MIDLINE THORACIC BACK PAIN: Status: RESOLVED | Noted: 2021-03-30 | Resolved: 2023-05-31

## 2023-05-31 PROBLEM — R07.81 RIB PAIN ON RIGHT SIDE: Status: RESOLVED | Noted: 2021-03-01 | Resolved: 2023-05-31

## 2023-05-31 LAB
ANION GAP SERPL CALCULATED.3IONS-SCNC: 10 MMOL/L (ref 4–13)
BASOPHILS # BLD AUTO: 0.06 THOUSANDS/ÂΜL (ref 0–0.1)
BASOPHILS NFR BLD AUTO: 1 % (ref 0–1)
BUN SERPL-MCNC: 14 MG/DL (ref 5–25)
CALCIUM SERPL-MCNC: 9.6 MG/DL (ref 8.4–10.2)
CHLORIDE SERPL-SCNC: 103 MMOL/L (ref 96–108)
CO2 SERPL-SCNC: 26 MMOL/L (ref 21–32)
CREAT SERPL-MCNC: 0.82 MG/DL (ref 0.6–1.3)
EOSINOPHIL # BLD AUTO: 0.13 THOUSAND/ÂΜL (ref 0–0.61)
EOSINOPHIL NFR BLD AUTO: 1 % (ref 0–6)
ERYTHROCYTE [DISTWIDTH] IN BLOOD BY AUTOMATED COUNT: 12.7 % (ref 11.6–15.1)
GFR SERPL CREATININE-BSD FRML MDRD: 95 ML/MIN/1.73SQ M
GLUCOSE P FAST SERPL-MCNC: 94 MG/DL (ref 65–99)
HCT VFR BLD AUTO: 40.7 % (ref 34.8–46.1)
HGB BLD-MCNC: 13.2 G/DL (ref 11.5–15.4)
IMM GRANULOCYTES # BLD AUTO: 0.02 THOUSAND/UL (ref 0–0.2)
IMM GRANULOCYTES NFR BLD AUTO: 0 % (ref 0–2)
LYMPHOCYTES # BLD AUTO: 3.2 THOUSANDS/ÂΜL (ref 0.6–4.47)
LYMPHOCYTES NFR BLD AUTO: 35 % (ref 14–44)
MCH RBC QN AUTO: 27.6 PG (ref 26.8–34.3)
MCHC RBC AUTO-ENTMCNC: 32.4 G/DL (ref 31.4–37.4)
MCV RBC AUTO: 85 FL (ref 82–98)
MONOCYTES # BLD AUTO: 0.73 THOUSAND/ÂΜL (ref 0.17–1.22)
MONOCYTES NFR BLD AUTO: 8 % (ref 4–12)
NEUTROPHILS # BLD AUTO: 5.02 THOUSANDS/ÂΜL (ref 1.85–7.62)
NEUTS SEG NFR BLD AUTO: 55 % (ref 43–75)
NRBC BLD AUTO-RTO: 0 /100 WBCS
PLATELET # BLD AUTO: 355 THOUSANDS/UL (ref 149–390)
PMV BLD AUTO: 10 FL (ref 8.9–12.7)
POTASSIUM SERPL-SCNC: 3.9 MMOL/L (ref 3.5–5.3)
RBC # BLD AUTO: 4.78 MILLION/UL (ref 3.81–5.12)
SODIUM SERPL-SCNC: 139 MMOL/L (ref 135–147)
WBC # BLD AUTO: 9.16 THOUSAND/UL (ref 4.31–10.16)

## 2023-05-31 PROCEDURE — 36415 COLL VENOUS BLD VENIPUNCTURE: CPT

## 2023-05-31 PROCEDURE — 80048 BASIC METABOLIC PNL TOTAL CA: CPT

## 2023-05-31 PROCEDURE — 85025 COMPLETE CBC W/AUTO DIFF WBC: CPT

## 2023-05-31 NOTE — PROGRESS NOTES
Jenae  PRACTICE ADRIANO    NAME: Neisha Clancy  AGE: 33 y o  SEX: female  : 1990     DATE: 2023    [facility] Pre-Operative Evaluation      Chief Complaint: Pre-operative Evaluation     Surgery: ENDOSCOPIC SINUS SURGERY, POSSIBLE SEPTOPLASTY, POSSIBLE TURBNATE REDUCTION  Anticipated Date of Surgery: 2023  Referring Provider: No ref  provider found       History of Present Illness:     Neisha Clancy is a 33 y o  female who presents to the office today for a preoperative consultation at the request of surgeon,   Dr Yuli Briggs, who plans on performing ENDOSCOPIC SINUS SURGERY, POSSIBLE SEPTOPLASTY, POSSIBLE TURBNATE REDUCTION   on 2023  Planned anesthesia is general  Patient has a bleeding risk of: no recent abnormal bleeding  Patient does not have objections to receiving blood products if needed  Not on any Current anti-platelet/anti-coagulation medications  Assessment of Chronic Conditions:   - Allergis/rhinitis: flonase, Ipratropium nasal spray, Ketotifen ophthalmic solution  - GERD: off medications     Assessment of Cardiac Risk:  · Denies unstable or severe angina or MI in the last 6 weeks or history of stent placement in the last year   · Denies decompensated heart failure (e g  New onset heart failure, NYHA functional class IV heart failure, or worsening existing heart failure)  · Denies significant arrhythmias such as high grade AV block, symptomatic ventricular arrhythmia, newly recognized ventricular tachycardia, supraventricular tachycardia with resting heart rate >100, or symptomatic bradycardia  · Denies severe heart valve disease including aortic stenosis or symptomatic mitral stenosis     Exercise Capacity:  · Able to walk 4 blocks without symptoms?: Yes  · Able to walk 2 flights without symptoms?: Yes   Occasionally feels SOB when has nasal congestion related to her underlying ENT problem  Prior Anesthesia Reactions: No     Personal history of venous thromboembolic disease? No    History of steroid use for >2 weeks within last year? No         Review of Systems:     Review of Systems   Constitutional: Negative for chills and fever  HENT: Positive for congestion  Negative for ear pain and sore throat  Eyes: Negative for pain and visual disturbance  Respiratory: Negative for cough and shortness of breath  Cardiovascular: Negative for chest pain and palpitations  Gastrointestinal: Negative for abdominal pain and vomiting  Genitourinary: Negative for dysuria and hematuria  Musculoskeletal: Negative for arthralgias and back pain  Skin: Negative for color change and rash  Neurological: Negative for seizures and syncope  All other systems reviewed and are negative  Current Problem List:     Patient Active Problem List   Diagnosis   • Chronic mastoiditis   • Amenorrhea   • Acute bilateral low back pain with sciatica   • Vasomotor rhinitis   • Non-seasonal allergic rhinitis   • Fatigue   • Rib pain on right side   • Gastroesophageal reflux disease   • Acute midline thoracic back pain   • Overweight   • BMI 30 0-30 9,adult   • Kidney calculus   • Left low back pain       Allergies: Allergies   Allergen Reactions   • Iodinated Contrast Media Other (See Comments)     Pt states she had ivc in [employer] as a child and was allergic  Doesn't remember what the reaction was but doctor told her not to have the dye   • Iodine - Food Allergy Hives and Other (See Comments)     Pt states she had ivc in [employer] as a child and was allergic  Doesn't remember what the reaction was but doctor told her not to have the dye   • Augmentin [Amoxicillin-Pot Clavulanate]      Epistaxis       • Shellfish Allergy - Food Allergy Itching   • Shellfish-Derived Products - Food Allergy Itching       Current Medications:       Current Outpatient Medications:   •  cetirizine (ZyrTEC) 10 mg tablet, Take 1 tablet (10 mg total) by mouth daily (Patient not taking: Reported on 1/25/2023), Disp: 30 tablet, Rfl: 3  •  fluticasone (FLONASE) 50 mcg/act nasal spray, SHAKE LIQUID AND USE 2 SPRAYS IN EACH NOSTRIL DAILY, Disp: 16 g, Rfl: 1  •  ibuprofen (MOTRIN) 600 mg tablet, Take 1 tablet (600 mg total) by mouth every 8 (eight) hours for 10 days, Disp: 30 tablet, Rfl: 0  •  ipratropium (ATROVENT) 0 03 % nasal spray, 2 sprays into each nostril in the morning, Disp: 30 mL, Rfl: 1  •  ketotifen (ZADITOR) 0 025 % ophthalmic solution, INSTILL 1 DROP IN BOTH EYES TWICE DAILY, Disp: 5 mL, Rfl: 0  •  loratadine (CLARITIN) 10 mg tablet, Take 1 tablet (10 mg total) by mouth daily, Disp: 90 tablet, Rfl: 0  •  minoxidil (LONITEN) 2 5 mg tablet, Take half a tablet (1 25 mg) once daily with a meal and a full glass of water , Disp: 45 tablet, Rfl: 0  •  pseudoephedrine (SUDAFED) 60 mg tablet, Take 1 tablet (60 mg total) by mouth every 4 (four) hours as needed for congestion for up to 5 days, Disp: 30 tablet, Rfl: 0    Past Medical History:       Past Medical History:   Diagnosis Date   • Diarrhea 4/8/2019   • Known health problems: none         Past Surgical History:   Procedure Laterality Date   • CHOLECYSTECTOMY     • TUBAL LIGATION Bilateral         Family History   Problem Relation Age of Onset   • Ovarian cancer Mother    • Uterine cancer Mother    • Osteoporosis Mother    • No Known Problems Father    • No Known Problems Sister    • No Known Problems Brother    • No Known Problems Daughter    • No Known Problems Son         Social History     Socioeconomic History   • Marital status: Single     Spouse name: Not on file   • Number of children: Not on file   • Years of education: Not on file   • Highest education level: Not on file   Occupational History   • Not on file   Tobacco Use   • Smoking status: Never   • Smokeless tobacco: Never   • Tobacco comments:     NO TOBACCO USE   Vaping Use   • Vaping Use: Never used   Substance and Sexual Activity   • Alcohol use: Never   • Drug use: Never   • Sexual activity: Yes     Partners: Male     Birth control/protection: Surgical   Other Topics Concern   • Not on file   Social History Narrative   • Not on file     Social Determinants of Health     Financial Resource Strain: High Risk (8/8/2022)    Overall Financial Resource Strain (CARDIA)    • Difficulty of Paying Living Expenses: Hard   Food Insecurity: Food Insecurity Present (8/8/2022)    Hunger Vital Sign    • Worried About Running Out of Food in the Last Year: Sometimes true    • Ran Out of Food in the Last Year: Sometimes true   Transportation Needs: Unmet Transportation Needs (8/8/2022)    PRAPARE - Transportation    • Lack of Transportation (Medical): Yes    • Lack of Transportation (Non-Medical): No   Physical Activity: Not on file   Stress: No Stress Concern Present (7/29/2021)    [address]    • Feeling of Stress : Not at all   Social Connections: Not on file   Intimate Partner Violence: Not on file   Housing Stability: Low Risk  (8/9/2021)    Housing Stability Vital Sign    • Unable to Pay for Housing in the Last Year: No    • Number of Places Lived in the Last Year: 1    • Unstable Housing in the Last Year: No        Physical Exam:     There were no vitals taken for this visit  Physical Exam  Constitutional:       General: She is not in acute distress  Appearance: Normal appearance  She is obese  She is not ill-appearing, toxic-appearing or diaphoretic  HENT:      Head: Normocephalic and atraumatic  Nose: Nose normal  No congestion  Mouth/Throat:      Mouth: Mucous membranes are moist    Eyes:      General:         Right eye: No discharge  Left eye: No discharge  Conjunctiva/sclera: Conjunctivae normal    Cardiovascular:      Rate and Rhythm: Normal rate and regular rhythm  Heart sounds: Normal heart sounds   No murmur heard   Pulmonary:      Effort: Pulmonary effort is normal  No respiratory distress  Breath sounds: Normal breath sounds  No wheezing or rhonchi  Musculoskeletal:         General: Normal range of motion  Skin:     General: Skin is warm  Neurological:      General: No focal deficit present  Mental Status: She is alert and oriented to person, place, and time  Cranial Nerves: No cranial nerve deficit  Psychiatric:         Mood and Affect: Mood normal          Behavior: Behavior normal          Thought Content: Thought content normal          Judgment: Judgment normal           Data:     Pre-operative work-up    Laboratory Results: I have personally reviewed the pertinent laboratory results/reports      EKG: N/A    Chest x-ray: N/A      Previous cardiopulmonary studies within the past year:  · Echocardiogram: N/A  · Cardiac Catheterization: N/A  · Stress Test: N/A  · Pulmonary Function Testing: N/A      Assessment & Recommendations:     No diagnosis found  Pre-Op Evaluation Assessment  33 y o  female with planned surgery as mentioned above  Known risk factors for perioperative complications: None  Current medications which may produce withdrawal symptoms if withheld perioperatively: None  Pre-Op Evaluation Plan  1  Further preoperative workup as follows:   - None; no further preoperative work-up is required    2  Medication Management/Recommendations:   - None, continue medication regimen including morning of surgery, with sip of water    3  Prophylaxis for cardiac events with perioperative beta-blockers: not indicated  4  Patient requires further consultation with: None     5  My recommendation for patient is to check with ENT surgeon if it is reasonable to continue nasal spray, such as Flonase, until the day of surgery or if she should suspend a few days before  Clearance  Patient is CLEARED for surgery without any additional cardiac testing       Jose Segura MD [address] [facility], [address] [place] [address]  [address] [id number]  Phone#  [phone]  Fax#  [phone]

## 2023-06-01 ENCOUNTER — OFFICE VISIT (OUTPATIENT)
Dept: URGENT CARE | Age: 33
End: 2023-06-01

## 2023-06-01 VITALS
BODY MASS INDEX: 30.47 KG/M2 | SYSTOLIC BLOOD PRESSURE: 110 MMHG | DIASTOLIC BLOOD PRESSURE: 90 MMHG | HEART RATE: 89 BPM | WEIGHT: 172 LBS | OXYGEN SATURATION: 99 % | TEMPERATURE: 97.3 F | RESPIRATION RATE: 20 BRPM

## 2023-06-01 DIAGNOSIS — R10.9 FLANK PAIN: ICD-10-CM

## 2023-06-01 DIAGNOSIS — R10.31 RIGHT LOWER QUADRANT ABDOMINAL PAIN: Primary | ICD-10-CM

## 2023-06-01 DIAGNOSIS — R39.9 UTI SYMPTOMS: ICD-10-CM

## 2023-06-01 LAB
SL AMB  POCT GLUCOSE, UA: NORMAL
SL AMB LEUKOCYTE ESTERASE,UA: NORMAL
SL AMB POCT BILIRUBIN,UA: NORMAL
SL AMB POCT BLOOD,UA: NORMAL
SL AMB POCT CLARITY,UA: NORMAL
SL AMB POCT COLOR,UA: YELLOW
SL AMB POCT KETONES,UA: NORMAL
SL AMB POCT NITRITE,UA: NORMAL
SL AMB POCT PH,UA: 6
SL AMB POCT SPECIFIC GRAVITY,UA: 1.02
SL AMB POCT URINE PROTEIN: NORMAL
SL AMB POCT UROBILINOGEN: 0.2

## 2023-06-02 ENCOUNTER — HOSPITAL ENCOUNTER (EMERGENCY)
Facility: HOSPITAL | Age: 33
Discharge: HOME | End: 2023-06-02
Attending: EMERGENCY MEDICINE

## 2023-06-02 ENCOUNTER — APPOINTMENT (EMERGENCY)
Dept: CT IMAGING | Facility: HOSPITAL | Age: 33
End: 2023-06-02

## 2023-06-02 VITALS
SYSTOLIC BLOOD PRESSURE: 120 MMHG | BODY MASS INDEX: 31.36 KG/M2 | OXYGEN SATURATION: 100 % | HEART RATE: 94 BPM | RESPIRATION RATE: 18 BRPM | TEMPERATURE: 98.4 F | DIASTOLIC BLOOD PRESSURE: 99 MMHG | WEIGHT: 177.03 LBS

## 2023-06-02 DIAGNOSIS — R30.9 PAIN WITH URINATION: Primary | ICD-10-CM

## 2023-06-02 LAB
BACTERIA UR CULT: ABNORMAL
BACTERIA UR CULT: ABNORMAL
BACTERIA UR QL AUTO: NORMAL /HPF
BILIRUB UR QL STRIP: NEGATIVE
CLARITY UR: CLEAR
COLOR UR: YELLOW
GLUCOSE UR STRIP-MCNC: NEGATIVE MG/DL
HGB UR QL STRIP.AUTO: ABNORMAL
KETONES UR STRIP-MCNC: NEGATIVE MG/DL
LEUKOCYTE ESTERASE UR QL STRIP: ABNORMAL
NITRITE UR QL STRIP: NEGATIVE
NON-SQ EPI CELLS URNS QL MICRO: NORMAL /HPF
PH UR STRIP.AUTO: 6.5 [PH] (ref 4.5–8)
PROT UR STRIP-MCNC: NEGATIVE MG/DL
RBC #/AREA URNS AUTO: NORMAL /HPF
SP GR UR STRIP.AUTO: 1.01 (ref 1–1.03)
UROBILINOGEN UR QL STRIP.AUTO: 0.2 E.U./DL
WBC #/AREA URNS AUTO: NORMAL /HPF

## 2023-06-02 RX ORDER — IBUPROFEN 600 MG/1
600 TABLET ORAL EVERY 6 HOURS PRN
Qty: 30 TABLET | Refills: 0 | Status: SHIPPED | OUTPATIENT
Start: 2023-06-02

## 2023-06-02 RX ORDER — IBUPROFEN 600 MG/1
600 TABLET ORAL ONCE
Status: COMPLETED | OUTPATIENT
Start: 2023-06-02 | End: 2023-06-02

## 2023-06-02 RX ADMIN — IBUPROFEN 600 MG: 600 TABLET, FILM COATED ORAL at 20:58

## 2023-06-02 NOTE — PROGRESS NOTES
[facility]        NAME: Neisha Clancy is a 33 y o  female  : 1990    MRN: [id number]  DATE: 2023  TIME: 8:23 PM    Assessment and Plan   Right lower quadrant abdominal pain [R10 31]  1  Right lower quadrant abdominal pain  Transfer to other facility      2  Flank pain  Transfer to other facility      3  UTI symptoms  Urine culture    POCT urine dip    Transfer to other facility        No signs of infection in urine dip, given patient's current symptoms of flank pain, suprapubic and right lower quadrant pain with fever, will send to the ER for further work with evaluation  Patient is requesting to go to [facility] for further evaluation  Patient Instructions       Follow up with PCP in 3-5 days  Proceed to  ER if symptoms worsen  Chief Complaint     Chief Complaint   Patient presents with   • Urinary Frequency   • Abdominal Pain   • Back Pain     Urinary frequency, lower abdominal pain, burning when peeing and  lower back pain for 2 weeks  History of Present Illness       Patient presenting for evaluation of UTI symptoms that have been progressing over the past two weeks  She states that she has had flank pain, suprapubic pain, dysuria, frequency and urgency  She states that the flank pain is constant, but the suprapubic pain is intermittent with urination  Patient states that she had a fever, Tmax of 102  She states that she took ibuprofen, this helps to reduce her fever  She denies any vaginal bleeding or discharge  She denies any current concern for STI or pregnancy at this time  Review of Systems   Review of Systems   Constitutional: Positive for chills and fever  Respiratory: Negative for shortness of breath  Cardiovascular: Negative for chest pain  Gastrointestinal: Positive for abdominal pain  Negative for diarrhea, nausea and vomiting  Genitourinary: Positive for dysuria, flank pain, frequency, pelvic pain and urgency   Negative for hematuria  All other systems reviewed and are negative          Current Medications       Current Outpatient Medications:   •  cetirizine (ZyrTEC) 10 mg tablet, Take 1 tablet (10 mg total) by mouth daily (Patient not taking: Reported on 1/25/2023), Disp: 30 tablet, Rfl: 3  •  fluticasone (FLONASE) 50 mcg/act nasal spray, SHAKE LIQUID AND USE 2 SPRAYS IN EACH NOSTRIL DAILY, Disp: 16 g, Rfl: 1  •  ibuprofen (MOTRIN) 600 mg tablet, Take 1 tablet (600 mg total) by mouth every 8 (eight) hours for 10 days, Disp: 30 tablet, Rfl: 0  •  ipratropium (ATROVENT) 0 03 % nasal spray, 2 sprays into each nostril in the morning, Disp: 30 mL, Rfl: 1  •  ketotifen (ZADITOR) 0 025 % ophthalmic solution, INSTILL 1 DROP IN BOTH EYES TWICE DAILY, Disp: 5 mL, Rfl: 0    Current Allergies     Allergies as of 06/01/2023 - Reviewed 06/01/2023   Allergen Reaction Noted   • Iodinated contrast media Other (See Comments) 04/28/2021   • Iodine - food allergy Hives and Other (See Comments) 08/24/2017   • Augmentin [amoxicillin-pot clavulanate]  12/18/2019   • Shellfish allergy - food allergy Itching 08/17/2018   • Shellfish-derived products - food allergy Itching 08/17/2018            The following portions of the patient's history were reviewed and updated as appropriate: allergies, current medications, past family history, past medical history, past social history, past surgical history and problem list      Past Medical History:   Diagnosis Date   • Acute bilateral low back pain with sciatica 8/17/2018   • Acute midline thoracic back pain 3/30/2021   • Diarrhea 4/8/2019   • Known health problems: none    • Left low back pain 8/25/2022   • Rib pain on right side 3/1/2021       Past Surgical History:   Procedure Laterality Date   • CHOLECYSTECTOMY     • TUBAL LIGATION Bilateral        Family History   Problem Relation Age of Onset   • Ovarian cancer Mother    • Uterine cancer Mother    • Osteoporosis Mother    • No Known Problems Father    • No Known Problems Sister    • No Known Problems Brother    • No Known Problems Daughter    • No Known Problems Son          Medications have been verified  Objective   /90   Pulse 89   Temp (!) 97 3 °F (36 3 °C) (Temporal)   Resp 20   Wt 78 kg (172 lb)   LMP  (LMP Unknown)   SpO2 99%   BMI 30 47 kg/m²        Physical Exam     Physical Exam  Vitals and nursing note reviewed  Constitutional:       General: She is not in acute distress  Appearance: Normal appearance  She is normal weight  She is not ill-appearing, toxic-appearing or diaphoretic  HENT:      Head: Normocephalic and atraumatic  Right Ear: Tympanic membrane normal       Left Ear: Tympanic membrane normal       Nose: Nose normal  No congestion or rhinorrhea  Mouth/Throat:      Mouth: Mucous membranes are moist       Pharynx: Oropharynx is clear  No oropharyngeal exudate or posterior oropharyngeal erythema  Eyes:      General:         Right eye: No discharge  Left eye: No discharge  Cardiovascular:      Rate and Rhythm: Normal rate and regular rhythm  Pulses: Normal pulses  Heart sounds: Normal heart sounds  No murmur heard  No friction rub  No gallop  Pulmonary:      Effort: Pulmonary effort is normal  No respiratory distress  Breath sounds: Normal breath sounds  No stridor  No wheezing, rhonchi or rales  Chest:      Chest wall: No tenderness  Abdominal:      General: Bowel sounds are normal       Palpations: Abdomen is soft  Tenderness: There is abdominal tenderness in the right lower quadrant and suprapubic area  There is right CVA tenderness and left CVA tenderness  Skin:     General: Skin is warm and dry  Neurological:      Mental Status: She is alert     Psychiatric:         Mood and Affect: Mood normal          Behavior: Behavior normal

## 2023-06-02 NOTE — ED PROVIDER NOTES
"History  Chief Complaint   Patient presents with   • Medical Problem     Pt states that for about 3 weeks she has been having \"bladder pain\", states she was seen at urgent care and given ABX  Reports feeling dizzy and weak  33year old female presents to ED with painful urination and LLQ abdominal pain for 3 weeks  She was seen at urgent care yesterday and a urine assessment was obtained  She was advised to visit the ED based on the results  She speaks Georgian and a  was used for the patient visit  No chlamydia or gonorrhea testing was performed at urgent care  Patient states that she had tubal ligation performed years before and is doubtful she is pregnant due to this  She denies fever, chills, shortness of breath, chest pain, nausea, vomiting, change in bowel habits  Denies new sexual partners  Denies vaginal discharge  Endorses urinary frequency  Does endorse history of kidney stone  She had a small kidney stone in her right ureter several years ago  This has not bothered her since  Prior to Admission Medications   Prescriptions Last Dose Informant Patient Reported? Taking?    cetirizine (ZyrTEC) 10 mg tablet  Self No No   Sig: Take 1 tablet (10 mg total) by mouth daily   Patient not taking: Reported on 2023   fluticasone (FLONASE) 50 mcg/act nasal spray  Self No No   Sig: SHAKE LIQUID AND USE 2 SPRAYS IN EACH NOSTRIL DAILY   ibuprofen (MOTRIN) 600 mg tablet  Self No No   Sig: Take 1 tablet (600 mg total) by mouth every 8 (eight) hours for 10 days   ipratropium (ATROVENT) 0 03 % nasal spray  Self No No   Si sprays into each nostril in the morning   ketotifen (ZADITOR) 0 025 % ophthalmic solution  Self No No   Sig: INSTILL 1 DROP IN BOTH EYES TWICE DAILY      Facility-Administered Medications: None       Past Medical History:   Diagnosis Date   • Acute bilateral low back pain with sciatica 2018   • Acute midline thoracic back pain 3/30/2021   • Diarrhea 2019   • " Known health problems: none    • Left low back pain 8/25/2022   • Rib pain on right side 3/1/2021       Past Surgical History:   Procedure Laterality Date   • CHOLECYSTECTOMY     • TUBAL LIGATION Bilateral        Family History   Problem Relation Age of Onset   • Ovarian cancer Mother    • Uterine cancer Mother    • Osteoporosis Mother    • No Known Problems Father    • No Known Problems Sister    • No Known Problems Brother    • No Known Problems Daughter    • No Known Problems Son      I have reviewed and agree with the history as documented  E-Cigarette/Vaping   • E-Cigarette Use Never User      E-Cigarette/Vaping Substances   • Nicotine No    • THC No    • CBD No    • Flavoring No    • Other No    • Unknown No      Social History     Tobacco Use   • Smoking status: Never   • Smokeless tobacco: Never   • Tobacco comments:     NO TOBACCO USE   Vaping Use   • Vaping Use: Never used   Substance Use Topics   • Alcohol use: Never   • Drug use: Never       Review of Systems   Constitutional: Negative for chills, diaphoresis, fatigue and fever  HENT: Negative for ear pain and sore throat  Eyes: Negative for pain, discharge, redness, itching and visual disturbance  Respiratory: Negative for cough, shortness of breath, wheezing and stridor  Cardiovascular: Negative for chest pain and palpitations  Gastrointestinal: Positive for abdominal pain  Negative for vomiting  Genitourinary: Positive for dysuria, pelvic pain and urgency  Negative for decreased urine volume, genital sores, hematuria, vaginal discharge and vaginal pain  Musculoskeletal: Negative for arthralgias and back pain  Skin: Negative for color change and rash  Neurological: Negative for seizures and syncope  All other systems reviewed and are negative  Physical Exam  Physical Exam  Vitals and nursing note reviewed  Constitutional:       General: She is not in acute distress  Appearance: Normal appearance   She is well-developed and normal weight  She is not ill-appearing, toxic-appearing or diaphoretic  HENT:      Head: Normocephalic and atraumatic  Mouth/Throat:      Pharynx: No oropharyngeal exudate or posterior oropharyngeal erythema  Eyes:      Conjunctiva/sclera: Conjunctivae normal       Pupils: Pupils are equal, round, and reactive to light  Cardiovascular:      Rate and Rhythm: Normal rate and regular rhythm  Heart sounds: No murmur heard  Pulmonary:      Effort: Pulmonary effort is normal  No respiratory distress  Breath sounds: Normal breath sounds  Abdominal:      General: Bowel sounds are normal       Palpations: Abdomen is soft  Tenderness: There is abdominal tenderness in the suprapubic area and left lower quadrant  There is no right CVA tenderness, left CVA tenderness, guarding or rebound  Negative signs include Palacio's sign, Rovsing's sign, McBurney's sign, psoas sign and obturator sign  Comments: Exquisite tenderness in suprapubic region    Musculoskeletal:         General: No swelling  Cervical back: Neck supple  Skin:     General: Skin is warm and dry  Capillary Refill: Capillary refill takes less than 2 seconds  Neurological:      Mental Status: She is alert     Psychiatric:         Mood and Affect: Mood normal          Vital Signs  ED Triage Vitals   Temperature Pulse Respirations Blood Pressure SpO2   06/02/23 1853 06/02/23 1853 06/02/23 1853 06/02/23 1853 06/02/23 1853   98 4 °F (36 9 °C) 94 18 120/99 100 %      Temp Source Heart Rate Source Patient Position - Orthostatic VS BP Location FiO2 (%)   06/02/23 1853 06/02/23 1853 -- -- --   Oral Monitor         Pain Score       06/02/23 1857       8           Vitals:    06/02/23 1853   BP: 120/99   Pulse: 94         Visual Acuity  Visual Acuity    Flowsheet Row Most Recent Value   L Pupil Size (mm) 3   R Pupil Size (mm) 3          ED Medications  Medications   ibuprofen (MOTRIN) tablet 600 mg (600 mg Oral Given 6/2/23 2058)       Diagnostic Studies  Results Reviewed     Procedure Component Value Units Date/Time    Urine Microscopic [[id number]]  (Normal) Collected: 06/02/23 1931    Lab Status: Final result Specimen: Urine, Clean Catch Updated: 06/02/23 1955     RBC, UA 1-2 /hpf      WBC, UA 1-2 /hpf      Epithelial Cells Occasional /hpf      Bacteria, UA Occasional /hpf     Molecular Vaginal Panel [[id number]] Collected: 06/02/23 1938    Lab Status: In process Specimen: Genital from Vaginal Updated: 06/02/23 1943    Chlamydia/GC amplified DNA by PCR [[id number]] Collected: 06/02/23 1938    Lab Status: In process Specimen: Urine, Other Updated: 06/02/23 1943    Urine Macroscopic, POC [[id number]]  (Abnormal) Collected: 06/02/23 1931    Lab Status: Final result Specimen: Urine Updated: 06/02/23 1932     Color, UA Yellow     Clarity, UA Clear     pH, UA 6 5     Leukocytes, UA Trace     Nitrite, UA Negative     Protein, UA Negative mg/dl      Glucose, UA Negative mg/dl      Ketones, UA Negative mg/dl      Urobilinogen, UA 0 2 E U /dl      Bilirubin, UA Negative     Occult Blood, UA Trace     Specific [place], UA 1 015    Narrative:      [facility] RESULT                 CT abdomen pelvis wo contrast   Final Result by Oswaldo Galvez MD (06/02 2031)      Possible 2 mm nonobstructing calculus in the right ureterovesical junction  No upstream collecting system dilatation  Otherwise, no acute findings  Large amount of stool throughout the colon  Workstation performed: [id number]                    Procedures  Procedures         ED Course                                             Medical Decision Making  Patient's presentation of LLQ pain, suprapubic pain, and pain with urination is concerning for UTI, nephrolithiasis, STI, PID  Will repeat UTI testing and additionally perform ghonorhea/chlamydia urine PCR, molecular vaginal panel       After further consideration and patient examination, will obtain CT of abdomen/pelvis without contrast due to patient contrast allergy  CT abdomen without contrast returned demonstrating moderate amounts of stool in colon, small nonobstructing stone in the right ureterovesical junction  Findings on CT not consistent with symptoms patient presents with  Discussed with patient the CT findings  Patient does not have any new sexual partners  Denies vaginal discharge  Still awaiting results of chlamydia and gonorrhea testing at this time  Also awaiting results of molecular vaginal panel  Explained to patient that we can give her a call when these results return  We will treat accordingly  Discussed case with attending Dr Kobe Gonzalez  He feels that it would be acceptable to wait until results return and treat for UTI if pending results are negative as her symptoms/presentation are most consistent with UTI  Patient is agreeable to plan  Patient given ibuprofen during visit for pain  Will be discharged with ibuprofen for pain as needed  Will call patient with test results and make changes to treatment plan accordingly  At the time of discharge patient is afebrile, in no acute distress  No sign of sepsis   used for entire visit and treatment planning  Prior to discharge, discharge instructions were discussed with patient at bedside  Patient was provided both verbal and written instructions  Patient is understanding of the discharge instructions and is agreeable to plan of care  Return precautions were discussed with patient bedside, patient verbalized understanding of signs and symptoms that would necessitate return to the ED  All questions were answered  Patient was comfortable with the plan of care and discharged to home  Amount and/or Complexity of Data Reviewed  Labs: ordered  Radiology: ordered  Risk  OTC drugs  Prescription drug management            Disposition  Final diagnoses:   Pain with urination     Time reflects when diagnosis was documented in both MDM as applicable and the Disposition within this note     Time User Action Codes Description Comment    6/2/2023  9:09 PM Jayjay Simple Add [R30 9] Pain with urination       ED Disposition     ED Disposition   Discharge    Condition   Stable    Date/Time   Fri Jun 2, 2023  9:09 PM    Comment   Nela Hall discharge to home/self care  Follow-up Information     Follow up With Specialties Details Why [facility]   [address] [facility] [address]  [facility] [address]            Patient's Medications   Discharge Prescriptions    IBUPROFEN (MOTRIN) 600 MG TABLET    Take 1 tablet (600 mg total) by mouth every 6 (six) hours as needed for mild pain       Start Date: 6/2/2023  End Date: --       Order Dose: 600 mg       Quantity: 30 tablet    Refills: 0       No discharge procedures on file      PDMP Review     None          ED Provider  Electronically Signed by           Kenia Milan PA-C  06/02/23 6049

## 2023-06-02 NOTE — PATIENT INSTRUCTIONS
Go to [address] [place] [address] for further work-up and evaluation      [address] [employer]  [place], [address] [facility]

## 2023-06-03 NOTE — DISCHARGE INSTRUCTIONS
We will contact you when the results of the pending labs return  We will provide you with medications once the results of the labs return  I provided you with a prescription for ibuprofen 600 mg every 6 hours for pain management  Return to ED if you develop fever, chills, nausea, vomiting, weakness

## 2023-06-04 LAB
C GLABRATA DNA VAG QL NAA+PROBE: NEGATIVE
C KRUSEI DNA VAG QL NAA+PROBE: NEGATIVE
CANDIDA SP 6 PNL VAG NAA+PROBE: POSITIVE
T VAGINALIS DNA VAG QL NAA+PROBE: NEGATIVE
VAGINOSIS/ITIS DNA PNL VAG PROBE+SIG AMP: NEGATIVE

## 2023-06-05 ENCOUNTER — DOCUMENTATION (OUTPATIENT)
Dept: EMERGENCY DEPT | Facility: HOSPITAL | Age: 33
End: 2023-06-05

## 2023-06-05 ENCOUNTER — TELEPHONE (OUTPATIENT)
Dept: OTHER | Facility: OTHER | Age: 33
End: 2023-06-05

## 2023-06-05 DIAGNOSIS — B37.31 VULVOVAGINAL CANDIDIASIS: Primary | ICD-10-CM

## 2023-06-05 LAB
C TRACH DNA SPEC QL NAA+PROBE: NEGATIVE
N GONORRHOEA DNA SPEC QL NAA+PROBE: NEGATIVE

## 2023-06-05 RX ORDER — FLUCONAZOLE 150 MG/1
150 TABLET ORAL ONCE
Qty: 1 TABLET | Refills: 0 | Status: SHIPPED | OUTPATIENT
Start: 2023-06-05 | End: 2023-06-05

## 2023-06-05 NOTE — TELEPHONE ENCOUNTER
Patient reports she was seen at the ED for painful urination and burning  She would like a call back with care advice for her lab results and she would like to make a follow up appointment  Patient needs an

## 2023-06-05 NOTE — RESULT ENCOUNTER NOTE
[id number]- verified pt by name and   Informed of +candida results  Sent diflucan to pharmacy  UC was + for GBS 20k colonies advised no tx required at this time

## 2023-06-09 NOTE — TELEPHONE ENCOUNTER
second attempt to contact patient  left message to return my call on answering machine, Letter sent

## 2023-06-20 ENCOUNTER — APPOINTMENT (OUTPATIENT)
Dept: RADIOLOGY | Age: 33
End: 2023-06-20
Payer: MEDICARE

## 2023-06-20 ENCOUNTER — OFFICE VISIT (OUTPATIENT)
Dept: URGENT CARE | Age: 33
End: 2023-06-20
Payer: MEDICARE

## 2023-06-20 VITALS
HEART RATE: 95 BPM | TEMPERATURE: 97.8 F | DIASTOLIC BLOOD PRESSURE: 64 MMHG | OXYGEN SATURATION: 98 % | RESPIRATION RATE: 12 BRPM | SYSTOLIC BLOOD PRESSURE: 126 MMHG

## 2023-06-20 DIAGNOSIS — R42 DIZZINESS AND GIDDINESS: ICD-10-CM

## 2023-06-20 DIAGNOSIS — R07.9 CHEST PAIN, UNSPECIFIED TYPE: Primary | ICD-10-CM

## 2023-06-20 DIAGNOSIS — R11.2 NAUSEA AND VOMITING, UNSPECIFIED VOMITING TYPE: ICD-10-CM

## 2023-06-20 DIAGNOSIS — R07.9 CHEST PAIN, UNSPECIFIED TYPE: ICD-10-CM

## 2023-06-20 LAB
GLUCOSE SERPL-MCNC: 130 MG/DL (ref 65–140)
SL AMB POCT GLUCOSE BLD: 130

## 2023-06-20 PROCEDURE — 99213 OFFICE O/P EST LOW 20 MIN: CPT

## 2023-06-20 PROCEDURE — 71046 X-RAY EXAM CHEST 2 VIEWS: CPT

## 2023-06-20 PROCEDURE — 82948 REAGENT STRIP/BLOOD GLUCOSE: CPT

## 2023-06-20 PROCEDURE — 93005 ELECTROCARDIOGRAM TRACING: CPT

## 2023-06-20 RX ORDER — ONDANSETRON 4 MG/1
4 TABLET, FILM COATED ORAL EVERY 8 HOURS PRN
Qty: 20 TABLET | Refills: 0 | Status: SHIPPED | OUTPATIENT
Start: 2023-06-20

## 2023-06-20 NOTE — PROGRESS NOTES
[facility]        NAME: Yonas Almaguer is a 33 y o  female  : 1990    MRN: [id number]  DATE: 2023  TIME: 7:46 PM    Assessment and Plan   Chest pain, unspecified type [R07 9]  1  Chest pain, unspecified type  ECG 12 lead    XR chest pa & lateral      2  Dizziness and giddiness  POCT blood glucose    CANCELED: POCT blood glucose      3  Nausea and vomiting, unspecified vomiting type  ondansetron (ZOFRAN) 4 mg tablet      EKG reviewed, no acute abnormality noted, awaiting official read  CXR reviewed, no acute abnormality noted, awaiting official read  POC Blood glucose 130  Please begin Zofran every 6-8 hours as needed for nausea/vomiting  Continue to monitor symptoms for the next 4-6 hours  If symptoms worsen in any way, present to emergency department for further evaluation  Patient Instructions    Chest Pain   WHAT YOU NEED TO KNOW:   Chest pain can be caused by a range of conditions, from not serious to life-threatening  Chest pain can be a symptom of a digestive problem, such as acid reflux or a stomach ulcer  An anxiety attack or a strong emotion, such as anger, can also cause chest pain  Infection, inflammation, or a fracture in the bones or cartilage in your chest can cause pain or discomfort  Sometimes chest pain or pressure is caused by poor blood flow to your heart (angina)  Chest pain may also be caused by life-threatening conditions such as a heart attack or blood clot in your lungs  DISCHARGE INSTRUCTIONS:   Call your local emergency number (911 in the [address] [place] [address],[address]) or have someone call if:   • You have any of the following signs of a heart attack:      ?  Squeezing, pressure, or pain in your chest    ? You may  also have any of the following:     - Discomfort or pain in your back, neck, jaw, stomach, or arm    - Shortness of breath    - Nausea or vomiting    - Lightheadedness or a sudden cold sweat      Return to the emergency department if:   • You have chest discomfort that gets worse, even with medicine  • You cough or vomit blood  • Your bowel movements are black or bloody  • You cannot stop vomiting, or it hurts to swallow  Call your doctor if:   • You have questions or concerns about your condition or care  Medicines:   • Medicines  may be given to treat the cause of your chest pain  Examples include pain medicine, anxiety medicine, or medicines to increase blood flow to your heart  • Do not take certain medicines without asking your healthcare provider first   These include NSAIDs, herbal or vitamin supplements, and hormones, such as estrogen or progestin  • Take your medicine as directed  Contact your healthcare provider if you think your medicine is not helping or if you have side effects  Tell your provider if you are allergic to any medicine  Keep a list of the medicines, vitamins, and herbs you take  Include the amounts, and when and why you take them  Bring the list or the pill bottles to follow-up visits  Carry your medicine list with you in case of an emergency  Healthy living tips: If the cause of your chest pain is known, your healthcare provider will give you specific guidelines to follow  The following are general healthy guidelines:  • Do not smoke  Nicotine and other chemicals in cigarettes and cigars can cause lung and heart damage  Ask your healthcare provider for information if you currently smoke and need help to quit  E-cigarettes or smokeless tobacco still contain nicotine  Talk to your healthcare provider before you use these products  • Choose a variety of healthy foods as often as possible  Include fresh, frozen, or canned fruits and vegetables  Also include low-fat dairy products, fish, chicken (without skin), and lean meats  Your healthcare provider or a dietitian can help you create meal plans  You may need to avoid certain foods or drinks if your pain is caused by a digestion problem           • Lower your sodium (salt) intake  Limit foods that are high in sodium, such as canned foods, salty snacks, and cold cuts  If you add salt when you cook food, do not add more at the table  Choose low-sodium canned foods as much as possible  • Drink plenty of water every day  Water helps your body to control your temperature and blood pressure  Ask your healthcare provider how much water you should drink every day  • Ask about activity  Your healthcare provider will tell you which activities to limit or avoid  Ask when you can drive, return to work, and have sex  Ask about the best exercise plan for you  • Maintain a healthy weight  Ask your healthcare provider what a healthy weight is for you  Ask him or her to help you create a safe weight loss plan if you are overweight  • Ask about vaccines you may need  Your healthcare provider can tell you which vaccines you need, and when to get them  The following vaccines help prevent diseases that can become serious for a person with a heart condition:    ? The influenza (flu) vaccine is given each year  Get a flu vaccine as soon as recommended, usually in September or October  ? The pneumonia vaccine is usually given every 5 years  Your healthcare provider may recommend the pneumonia vaccine if you are 72 or older  ? COVID-19 vaccines are given to adults as a shot in 1 or 2 doses  Vaccination is recommended for all adults  A booster (additional) dose is also recommended for all adults  A second booster is recommended for all adults 48 or older and for immunocompromised adults 25 or older  The second booster is also recommended for adults who received the 1-dose vaccine for the first and booster doses  Your healthcare provider can tell you when to get one or both boosters  Follow up with your doctor within 72 hours, or as directed: You may need to return for more tests to find the cause of your chest pain   You may be referred to a specialist, such as a cardiologist or gastroenterologist  Write down your questions so you remember to ask them during your visits  © Copyright [employer] 2022 Information is for End User's use only and may not be sold, redistributed or otherwise used for commercial purposes  The above information is an  only  It is not intended as medical advice for individual conditions or treatments  Talk to your doctor, nurse or pharmacist before following any medical regimen to see if it is safe and effective for you  Dizziness   WHAT YOU NEED TO KNOW:   Dizziness is a feeling of being off balance or unsteady  Common causes of dizziness are an inner ear fluid imbalance or a lack of oxygen in your blood  Dizziness may be acute (lasts 3 days or less) or chronic (lasts longer than 3 days)  You may have dizzy spells that last from seconds to a few hours  DISCHARGE INSTRUCTIONS:   Return to the emergency department if:   • You have a headache and a stiff neck  • You have shaking chills and a fever  • You vomit over and over with no relief  • Your vomit or bowel movements are red or black  • You have pain in your chest, back, or abdomen  • You have numbness, especially in your face, arms, or legs  • You have trouble moving your arms or legs  • You are confused  Contact your healthcare provider if:   • You have a fever  • Your symptoms do not get better with treatment  • You have questions or concerns about your condition or care  Manage your symptoms:   • Do not drive  or operate heavy machinery when you are dizzy  • Get up slowly  from sitting or lying down  • Drink plenty of liquids  Liquids help prevent dehydration  Ask how much liquid to drink each day and which liquids are best for you  Follow up with your doctor as directed:  Write down your questions so you remember to ask them during your visits    © Copyright [employer] 2022 Information is for End User's use only and may not be sold, redistributed or otherwise used for commercial purposes  The above information is an  only  It is not intended as medical advice for individual conditions or treatments  Talk to your doctor, nurse or pharmacist before following any medical regimen to see if it is safe and effective for you  Follow up with PCP in 3-5 days  Proceed to  ER if symptoms worsen  Chief Complaint     Chief Complaint   Patient presents with   • Vomiting     Vomited 2 times today   • Chest Pain     Stabbing chest pain that started today   • Nausea   • Rapid Heart Rate     Feels like heart is pounding out of chest         History of Present Illness       Patient is a 33year old female with PMH significant for GERD, tubal ligation, migraine, who presents for evaluation of chest pain, nausea/vomiting and dizziness which began today  She reports that she began experiencing nausea this afternoon, vomited twice and then began experiencing dizziness, chest pressure and intermittent palpitations  She also reports chills  She denies SOB, syncope, blood in vomit, fever  Review of Systems   Review of Systems   Constitutional: Positive for chills  Negative for fatigue and fever  HENT: Negative for congestion, ear discharge, ear pain, postnasal drip, rhinorrhea, sinus pressure, sinus pain, sneezing and sore throat  Eyes: Negative  Negative for pain, discharge, redness and itching  Respiratory: Negative  Negative for apnea, cough, choking, chest tightness, shortness of breath, wheezing and stridor  Cardiovascular: Positive for chest pain and palpitations  Negative for leg swelling  Gastrointestinal: Positive for nausea and vomiting  Negative for diarrhea  Endocrine: Negative  Negative for polydipsia, polyphagia and polyuria  Genitourinary: Negative  Negative for decreased urine volume and flank pain  Musculoskeletal: Negative    Negative for arthralgias, back pain, myalgias, neck pain and neck stiffness  Skin: Negative  Negative for color change and rash  Allergic/Immunologic: Negative  Negative for environmental allergies  Neurological: Positive for dizziness  Negative for facial asymmetry, light-headedness, numbness and headaches  Hematological: Negative  Negative for adenopathy  Psychiatric/Behavioral: Negative            Current Medications       Current Outpatient Medications:   •  ondansetron (ZOFRAN) 4 mg tablet, Take 1 tablet (4 mg total) by mouth every 8 (eight) hours as needed for nausea or vomiting, Disp: 20 tablet, Rfl: 0  •  cetirizine (ZyrTEC) 10 mg tablet, Take 1 tablet (10 mg total) by mouth daily (Patient not taking: Reported on 1/25/2023), Disp: 30 tablet, Rfl: 3  •  fluticasone (FLONASE) 50 mcg/act nasal spray, SHAKE LIQUID AND USE 2 SPRAYS IN EACH NOSTRIL DAILY, Disp: 16 g, Rfl: 1  •  ibuprofen (MOTRIN) 600 mg tablet, Take 1 tablet (600 mg total) by mouth every 8 (eight) hours for 10 days, Disp: 30 tablet, Rfl: 0  •  ibuprofen (MOTRIN) 600 mg tablet, Take 1 tablet (600 mg total) by mouth every 6 (six) hours as needed for mild pain, Disp: 30 tablet, Rfl: 0  •  ipratropium (ATROVENT) 0 03 % nasal spray, 2 sprays into each nostril in the morning, Disp: 30 mL, Rfl: 1  •  ketotifen (ZADITOR) 0 025 % ophthalmic solution, INSTILL 1 DROP IN BOTH EYES TWICE DAILY, Disp: 5 mL, Rfl: 0    Current Allergies     Allergies as of 06/20/2023 - Reviewed 06/20/2023   Allergen Reaction Noted   • Iodinated contrast media Other (See Comments) 04/28/2021   • Iodine - food allergy Hives and Other (See Comments) 08/24/2017   • Augmentin [amoxicillin-pot clavulanate]  12/18/2019   • Shellfish allergy - food allergy Itching 08/17/2018   • Shellfish-derived products - food allergy Itching 08/17/2018            The following portions of the patient's history were reviewed and updated as appropriate: allergies, current medications, past family history, past medical history, past social history, past surgical history and problem list      Past Medical History:   Diagnosis Date   • Acute bilateral low back pain with sciatica 8/17/2018   • Acute midline thoracic back pain 3/30/2021   • Diarrhea 4/8/2019   • Known health problems: none    • Left low back pain 8/25/2022   • Rib pain on right side 3/1/2021       Past Surgical History:   Procedure Laterality Date   • CHOLECYSTECTOMY     • TUBAL LIGATION Bilateral        Family History   Problem Relation Age of Onset   • Ovarian cancer Mother    • Uterine cancer Mother    • Osteoporosis Mother    • No Known Problems Father    • No Known Problems Sister    • No Known Problems Brother    • No Known Problems Daughter    • No Known Problems Son          Medications have been verified  Objective   /64 (BP Location: Left arm, Patient Position: Sitting, Cuff Size: Large)   Pulse 95   Temp 97 8 °F (36 6 °C) (Temporal)   Resp 12   LMP 06/18/2023   SpO2 98%        Physical Exam     Physical Exam  Vitals and nursing note reviewed  Constitutional:       General: She is not in acute distress  Appearance: Normal appearance  She is not ill-appearing, toxic-appearing or diaphoretic  Interventions: She is not intubated  HENT:      Head: Normocephalic and atraumatic  Right Ear: Tympanic membrane normal       Left Ear: Tympanic membrane normal       Nose: Nose normal  No congestion or rhinorrhea  Mouth/Throat:      Mouth: Mucous membranes are moist    Eyes:      Extraocular Movements: Extraocular movements intact  Conjunctiva/sclera: Conjunctivae normal       Pupils: Pupils are equal, round, and reactive to light  Cardiovascular:      Rate and Rhythm: Normal rate and regular rhythm  Pulses: Normal pulses  Heart sounds: Normal heart sounds, S1 normal and S2 normal  Heart sounds not distant  No murmur heard  No systolic murmur is present  No friction rub  No gallop     Pulmonary:      Effort: Pulmonary effort is normal  No tachypnea, bradypnea, accessory muscle usage, prolonged expiration, respiratory distress or retractions  She is not intubated  Breath sounds: Normal breath sounds  No stridor, decreased air movement or transmitted upper airway sounds  No decreased breath sounds, wheezing, rhonchi or rales  Chest:      Chest wall: Tenderness present  No mass, lacerations, deformity, swelling, crepitus or edema  There is no dullness to percussion  Comments: (+) Reproducible tenderness of left chest wall  Abdominal:      General: Bowel sounds are normal       Palpations: Abdomen is soft  Tenderness: There is no abdominal tenderness  There is no guarding or rebound  Musculoskeletal:         General: Normal range of motion  Cervical back: Normal range of motion and neck supple  No tenderness  Skin:     General: Skin is warm and dry  Capillary Refill: Capillary refill takes less than 2 seconds  Neurological:      General: No focal deficit present  Mental Status: She is alert and oriented to person, place, and time  Cranial Nerves: No cranial nerve deficit     Psychiatric:         Mood and Affect: Mood normal          Behavior: Behavior normal

## 2023-06-20 NOTE — PATIENT INSTRUCTIONS
EKG reviewed, no acute abnormality noted, awaiting official read  CXR reviewed, no acute abnormality noted, awaiting official read  POC Blood glucose 130  Please begin Zofran every 6-8 hours as needed for nausea/vomiting  Continue to monitor symptoms for the next 4-6 hours  If symptoms worsen in any way, present to emergency department for further evaluation

## 2023-06-21 LAB
ATRIAL RATE: 86 BPM
P AXIS: 51 DEGREES
PR INTERVAL: 150 MS
QRS AXIS: 33 DEGREES
QRSD INTERVAL: 96 MS
QT INTERVAL: 374 MS
QTC INTERVAL: 447 MS
T WAVE AXIS: 25 DEGREES
VENTRICULAR RATE: 86 BPM

## 2023-06-21 PROCEDURE — 93010 ELECTROCARDIOGRAM REPORT: CPT | Performed by: INTERNAL MEDICINE

## 2023-07-03 ENCOUNTER — TELEPHONE (OUTPATIENT)
Dept: PEDIATRICS CLINIC | Facility: CLINIC | Age: 33
End: 2023-07-03

## 2023-07-07 ENCOUNTER — TELEPHONE (OUTPATIENT)
Dept: OBGYN CLINIC | Facility: CLINIC | Age: 33
End: 2023-07-07

## 2023-07-10 ENCOUNTER — TELEPHONE (OUTPATIENT)
Dept: OBGYN CLINIC | Facility: CLINIC | Age: 33
End: 2023-07-10

## 2023-08-05 DIAGNOSIS — J01.00 ACUTE NON-RECURRENT MAXILLARY SINUSITIS: ICD-10-CM

## 2023-08-05 RX ORDER — DOXYCYCLINE 100 MG/1
CAPSULE ORAL
Qty: 14 CAPSULE | Refills: 0 | OUTPATIENT
Start: 2023-08-05

## 2023-08-22 ENCOUNTER — OFFICE VISIT (OUTPATIENT)
Dept: URGENT CARE | Facility: CLINIC | Age: 33
End: 2023-08-22
Payer: MEDICARE

## 2023-08-22 VITALS
HEIGHT: 62 IN | SYSTOLIC BLOOD PRESSURE: 112 MMHG | OXYGEN SATURATION: 99 % | TEMPERATURE: 98.4 F | BODY MASS INDEX: 32.39 KG/M2 | DIASTOLIC BLOOD PRESSURE: 78 MMHG | WEIGHT: 176 LBS | HEART RATE: 82 BPM | RESPIRATION RATE: 18 BRPM

## 2023-08-22 DIAGNOSIS — H70.92 MASTOIDITIS OF LEFT SIDE: Primary | ICD-10-CM

## 2023-08-22 PROCEDURE — 99213 OFFICE O/P EST LOW 20 MIN: CPT

## 2023-08-22 RX ORDER — PENICILLIN V POTASSIUM 250 MG/1
500 TABLET ORAL 2 TIMES DAILY
Qty: 40 TABLET | Refills: 0 | Status: SHIPPED | OUTPATIENT
Start: 2023-08-22 | End: 2023-09-01

## 2023-08-22 NOTE — PROGRESS NOTES
[facility]        NAME: Ade English is a 29 y.o. female  : 1990    MRN: [id number]  DATE: 2023  TIME: 3:40 PM    Assessment and Plan   Mastoiditis of left side [H70.92]  1. Mastoiditis of left side  penicillin V potassium (VEETID) 250 mg tablet        Patient presents for eval of left ear pain. TTP behind ear. Some redness and swelling noted. Recently finished Doxy for sinusitis. Discussed abx for treatment of possible mastoiditis. Pt allergic to Augmentin - had an episode of epistatxis. Discussed treating with PCN and f/u with PCP for worsening/concerns. Also has c/o ongoing reproducible midsternal pain. Has not taken anything . Has been seen recently for same with normal EKG- symptom management for cough and gerd was given. Patient Instructions       Follow up with PCPas needed  Proceed to  ER if symptoms worsen. Chief Complaint     Chief Complaint   Patient presents with   • Earache     Pt presents with left sided head, neck, and ear pain. PT states pain when turning head. PT also complains of midline chest wall pain. History of Present Illness       Patient presents for eval of left ear pain. TTP behind ear. Some redness and swelling noted. Recently finished Doxy for sinusitis. Discussed abx for treatment of possible mastoiditis. Pt allergic to Augmentin - had an episode of epistatxis. Discussed treating with PCN and f/u with PCP for worsening/concerns. Also has c/o ongoing reproducible midsternal pain. Has not taken anything . Has been seen recently for same with normal EKG- symptom management for cough and gerd was given. Review of Systems   Review of Systems   Constitutional: Negative for activity change and fever. HENT: Positive for ear pain. Negative for ear discharge and hearing loss. Musculoskeletal: Positive for neck pain. Negative for neck stiffness. Neurological: Negative for headaches.    All other systems reviewed and are negative.         Current Medications       Current Outpatient Medications:   •  cetirizine (ZyrTEC) 10 mg tablet, Take 1 tablet (10 mg total) by mouth daily, Disp: 30 tablet, Rfl: 3  •  fluticasone (FLONASE) 50 mcg/act nasal spray, SHAKE LIQUID AND USE 2 SPRAYS IN EACH NOSTRIL DAILY, Disp: 16 g, Rfl: 1  •  ibuprofen (MOTRIN) 600 mg tablet, Take 1 tablet (600 mg total) by mouth every 6 (six) hours as needed for mild pain, Disp: 30 tablet, Rfl: 0  •  ipratropium (ATROVENT) 0.03 % nasal spray, 2 sprays into each nostril in the morning, Disp: 30 mL, Rfl: 1  •  penicillin V potassium (VEETID) 250 mg tablet, Take 2 tablets (500 mg total) by mouth 2 (two) times a day for 10 days, Disp: 40 tablet, Rfl: 0  •  ibuprofen (MOTRIN) 600 mg tablet, Take 1 tablet (600 mg total) by mouth every 8 (eight) hours for 10 days, Disp: 30 tablet, Rfl: 0  •  ketotifen (ZADITOR) 0.025 % ophthalmic solution, INSTILL 1 DROP IN BOTH EYES TWICE DAILY (Patient not taking: Reported on 8/22/2023), Disp: 5 mL, Rfl: 0  •  ondansetron (ZOFRAN) 4 mg tablet, Take 1 tablet (4 mg total) by mouth every 8 (eight) hours as needed for nausea or vomiting (Patient not taking: Reported on 8/22/2023), Disp: 20 tablet, Rfl: 0    Current Allergies     Allergies as of 08/22/2023 - Reviewed 08/22/2023   Allergen Reaction Noted   • Iodinated contrast media Other (See Comments) 04/28/2021   • Iodine - food allergy Hives and Other (See Comments) 08/24/2017   • Augmentin [amoxicillin-pot clavulanate]  12/18/2019   • Shellfish allergy - food allergy Itching 08/17/2018   • Shellfish-derived products - food allergy Itching 08/17/2018            The following portions of the patient's history were reviewed and updated as appropriate: allergies, current medications, past family history, past medical history, past social history, past surgical history and problem list.     Past Medical History:   Diagnosis Date   • Acute bilateral low back pain with sciatica 8/17/2018   • Acute midline thoracic back pain 3/30/2021   • Diarrhea 4/8/2019   • Known health problems: none    • Left low back pain 8/25/2022   • Rib pain on right side 3/1/2021       Past Surgical History:   Procedure Laterality Date   • CHOLECYSTECTOMY     • TUBAL LIGATION Bilateral        Family History   Problem Relation Age of Onset   • Ovarian cancer Mother    • Uterine cancer Mother    • Osteoporosis Mother    • No Known Problems Father    • No Known Problems Sister    • No Known Problems Brother    • No Known Problems Daughter    • No Known Problems Son          Medications have been verified. Objective   /78   Pulse 82   Temp 98.4 °F (36.9 °C)   Resp 18   Ht 5' 2" (1.575 m)   Wt 79.8 kg (176 lb)   SpO2 99%   BMI 32.19 kg/m²   No LMP recorded. Physical Exam     Physical Exam  Vitals reviewed. Constitutional:       Appearance: Normal appearance. HENT:      Left Ear: Tenderness present. There is mastoid tenderness. Cardiovascular:      Rate and Rhythm: Normal rate and regular rhythm. Pulses: Normal pulses. Heart sounds: Normal heart sounds. Pulmonary:      Effort: Pulmonary effort is normal.      Breath sounds: Normal breath sounds. Lymphadenopathy:      Cervical: No cervical adenopathy. Neurological:      Mental Status: She is alert.

## 2023-09-26 ENCOUNTER — TELEPHONE (OUTPATIENT)
Dept: OBGYN CLINIC | Facility: CLINIC | Age: 33
End: 2023-09-26